# Patient Record
Sex: FEMALE | Race: WHITE | NOT HISPANIC OR LATINO | Employment: FULL TIME | URBAN - METROPOLITAN AREA
[De-identification: names, ages, dates, MRNs, and addresses within clinical notes are randomized per-mention and may not be internally consistent; named-entity substitution may affect disease eponyms.]

---

## 2017-10-11 ENCOUNTER — OFFICE VISIT (OUTPATIENT)
Dept: URGENT CARE | Facility: PHYSICIAN GROUP | Age: 27
End: 2017-10-11
Payer: COMMERCIAL

## 2017-10-11 VITALS
RESPIRATION RATE: 16 BRPM | BODY MASS INDEX: 31.16 KG/M2 | OXYGEN SATURATION: 97 % | SYSTOLIC BLOOD PRESSURE: 118 MMHG | WEIGHT: 187 LBS | TEMPERATURE: 98.2 F | HEIGHT: 65 IN | DIASTOLIC BLOOD PRESSURE: 76 MMHG | HEART RATE: 102 BPM

## 2017-10-11 DIAGNOSIS — E66.9 OBESITY (BMI 30-39.9): ICD-10-CM

## 2017-10-11 DIAGNOSIS — J02.0 STREP PHARYNGITIS: ICD-10-CM

## 2017-10-11 DIAGNOSIS — J02.9 SORE THROAT: ICD-10-CM

## 2017-10-11 LAB
INT CON NEG: NORMAL
INT CON POS: NORMAL
S PYO AG THROAT QL: POSITIVE

## 2017-10-11 PROCEDURE — 87880 STREP A ASSAY W/OPTIC: CPT | Performed by: PHYSICIAN ASSISTANT

## 2017-10-11 PROCEDURE — 99203 OFFICE O/P NEW LOW 30 MIN: CPT | Performed by: PHYSICIAN ASSISTANT

## 2017-10-11 RX ORDER — AMOXICILLIN 875 MG/1
875 TABLET, COATED ORAL 2 TIMES DAILY
Qty: 20 TAB | Refills: 0 | Status: SHIPPED | OUTPATIENT
Start: 2017-10-11 | End: 2020-06-28

## 2017-10-11 RX ORDER — IBUPROFEN 200 MG
400 TABLET ORAL EVERY 8 HOURS PRN
COMMUNITY
End: 2020-06-28

## 2017-10-11 ASSESSMENT — ENCOUNTER SYMPTOMS
FEVER: 1
SORE THROAT: 1
DIARRHEA: 0
WHEEZING: 0
CONSTIPATION: 0
COUGH: 0
CARDIOVASCULAR NEGATIVE: 1
HOARSE VOICE: 1
SWOLLEN GLANDS: 1
ABDOMINAL PAIN: 0
CHILLS: 1
MYALGIAS: 1
TROUBLE SWALLOWING: 1
SHORTNESS OF BREATH: 0
HEADACHES: 1
VOMITING: 0

## 2017-10-11 NOTE — PROGRESS NOTES
Subjective:      Sweta Gill is a 26 y.o. female who presents with Pharyngitis (x 2 days)            Pharyngitis    This is a new problem. The current episode started in the past 7 days. The problem has been gradually worsening. The maximum temperature recorded prior to her arrival was 100.4 - 100.9 F. The fever has been present for 1 to 2 days. Associated symptoms include headaches, a hoarse voice, swollen glands and trouble swallowing. Pertinent negatives include no abdominal pain, congestion, coughing, diarrhea, ear pain, shortness of breath or vomiting. She has had no exposure to strep. She has tried NSAIDs for the symptoms. The treatment provided mild relief.       PMH:  has no past medical history on file.  MEDS:   Current Outpatient Prescriptions:   •  ibuprofen (MOTRIN) 200 MG Tab, Take 400 mg by mouth every 8 hours as needed., Disp: , Rfl:   •  amoxicillin (AMOXIL) 875 MG tablet, Take 1 Tab by mouth 2 times a day., Disp: 20 Tab, Rfl: 0  •  azithromycin (ZITHROMAX Z-EVIN) 250 MG TABS, Take 1 Tab by mouth every day. Take 2 tablets by mouth day one and one tablet by mouth days 2 through 5, Disp: 6 Tab, Rfl: 0  ALLERGIES: No Known Allergies  SURGHX: No past surgical history on file.  SOCHX:  reports that she has been smoking.  She has been smoking about 0.50 packs per day. She does not have any smokeless tobacco history on file. She reports that she drinks alcohol. She reports that she does not use drugs.  FH: family history is not on file.    Review of Systems   Constitutional: Positive for chills and fever.   HENT: Positive for hoarse voice, sore throat and trouble swallowing. Negative for congestion and ear pain.    Respiratory: Negative for cough, shortness of breath and wheezing.    Cardiovascular: Negative.    Gastrointestinal: Negative for abdominal pain, constipation, diarrhea and vomiting.   Musculoskeletal: Positive for myalgias.   Neurological: Positive for headaches.       Medications,  "Allergies, and current problem list reviewed today in Epic  Family history reviewed with patient and is not pertinent for today's visit     Objective:     /76   Pulse (!) 102   Temp 36.8 °C (98.2 °F)   Resp 16   Ht 1.651 m (5' 5\")   Wt 84.8 kg (187 lb)   SpO2 97%   BMI 31.12 kg/m²      Physical Exam   Constitutional: She is oriented to person, place, and time. She appears well-developed and well-nourished. No distress.   HENT:   Head: Normocephalic and atraumatic.   Right Ear: Hearing, tympanic membrane and external ear normal.   Left Ear: Hearing, tympanic membrane and external ear normal.   Nose: Nose normal.   Mouth/Throat: Normal dentition. No dental caries. Oropharyngeal exudate, posterior oropharyngeal edema and posterior oropharyngeal erythema present.   Eyes: Conjunctivae and EOM are normal. Pupils are equal, round, and reactive to light. Right eye exhibits no discharge. Left eye exhibits no discharge. No scleral icterus.   Neck: Normal range of motion. Neck supple. No tracheal deviation present. No thyromegaly present.   Cardiovascular: Normal rate, regular rhythm and normal heart sounds.    Pulmonary/Chest: Effort normal and breath sounds normal. No respiratory distress. She has no wheezes.   Lymphadenopathy:        Head (right side): Submandibular adenopathy present.        Head (left side): Submandibular adenopathy present.     She has cervical adenopathy.        Right cervical: No superficial cervical adenopathy present.       Left cervical: No superficial cervical adenopathy present.   Neurological: She is alert and oriented to person, place, and time.   Skin: Skin is warm and dry. She is not diaphoretic. No cyanosis. Nails show no clubbing.   Psychiatric: She has a normal mood and affect. Her behavior is normal. Judgment and thought content normal.   Nursing note and vitals reviewed.              Assessment/Plan:     1. Sore throat  POCT Rapid Strep A   2. Strep pharyngitis  amoxicillin " (AMOXIL) 875 MG tablet     Strep: POS  Take full course of antibiotic  Increase fluids and rest  Advil or Tylenol for fever and pain  Warm beverages or Chloraseptic spray    Return to clinic or go to ED if symptoms worsen or persist. Indications for ED discussed at length. Patient voices understanding. Follow-up with your primary care provider in 3-5 days. Red flags discussed.    Please note that this dictation was created using voice recognition software. I have made every reasonable attempt to correct obvious errors, but I expect that there are errors of grammar and possibly content that I did not discover before finalizing the note.

## 2017-10-11 NOTE — LETTER
October 11, 2017         Patient: Sweta Gill   YOB: 1990   Date of Visit: 10/11/2017           To Whom it May Concern:    Sweta Gill was seen in my clinic on 10/11/2017. She may return to work on Thurs. Oct. 12th.    If you have any questions or concerns, please don't hesitate to call.        Sincerely,           Kendall Nichols P.A.-C.  Electronically Signed

## 2018-05-30 ENCOUNTER — HOSPITAL ENCOUNTER (INPATIENT)
Dept: HOSPITAL 8 - ED | Age: 28
LOS: 5 days | Discharge: HOME | DRG: 690 | End: 2018-06-04
Attending: OBSTETRICS & GYNECOLOGY | Admitting: OBSTETRICS & GYNECOLOGY
Payer: COMMERCIAL

## 2018-05-30 VITALS — WEIGHT: 175.49 LBS | BODY MASS INDEX: 29.24 KG/M2 | HEIGHT: 65 IN

## 2018-05-30 VITALS — DIASTOLIC BLOOD PRESSURE: 77 MMHG | SYSTOLIC BLOOD PRESSURE: 117 MMHG

## 2018-05-30 DIAGNOSIS — A54.24: ICD-10-CM

## 2018-05-30 DIAGNOSIS — N70.93: ICD-10-CM

## 2018-05-30 DIAGNOSIS — A56.11: Primary | ICD-10-CM

## 2018-05-30 DIAGNOSIS — F17.200: ICD-10-CM

## 2018-05-30 DIAGNOSIS — N83.209: ICD-10-CM

## 2018-05-30 LAB
CLUE CELLS: (no result)
T VAGINALIS RRNA GENITAL QL PROBE: (no result)
T4 FREE SERPL-MCNC: 1.05 NG/DL (ref 0.76–1.46)
TSH SERPL-ACNC: 1.69 MIU/L (ref 0.36–3.74)
WET PREP WBCS: (no result)

## 2018-05-30 PROCEDURE — S0074 INJECTION, CEFOTETAN DISODIU: HCPCS

## 2018-05-30 PROCEDURE — 96374 THER/PROPH/DIAG INJ IV PUSH: CPT

## 2018-05-30 PROCEDURE — 36415 COLL VENOUS BLD VENIPUNCTURE: CPT

## 2018-05-30 PROCEDURE — 87205 SMEAR GRAM STAIN: CPT

## 2018-05-30 PROCEDURE — 86592 SYPHILIS TEST NON-TREP QUAL: CPT

## 2018-05-30 PROCEDURE — 85025 COMPLETE CBC W/AUTO DIFF WBC: CPT

## 2018-05-30 PROCEDURE — 76830 TRANSVAGINAL US NON-OB: CPT

## 2018-05-30 PROCEDURE — 87591 N.GONORRHOEAE DNA AMP PROB: CPT

## 2018-05-30 PROCEDURE — 87491 CHLMYD TRACH DNA AMP PROBE: CPT

## 2018-05-30 PROCEDURE — 87147 CULTURE TYPE IMMUNOLOGIC: CPT

## 2018-05-30 PROCEDURE — 84439 ASSAY OF FREE THYROXINE: CPT

## 2018-05-30 PROCEDURE — 87210 SMEAR WET MOUNT SALINE/INK: CPT

## 2018-05-30 PROCEDURE — 87070 CULTURE OTHR SPECIMN AEROBIC: CPT

## 2018-05-30 PROCEDURE — 84443 ASSAY THYROID STIM HORMONE: CPT

## 2018-05-30 PROCEDURE — 87808 TRICHOMONAS ASSAY W/OPTIC: CPT

## 2018-05-30 RX ADMIN — CEFOTETAN AND DEXTROSE SCH MLS/HR: 2 INJECTION, SOLUTION INTRAVENOUS at 19:20

## 2018-05-30 RX ADMIN — DOXYCYCLINE SCH MLS/HR: 100 INJECTION, POWDER, LYOPHILIZED, FOR SOLUTION INTRAVENOUS at 21:06

## 2018-05-31 VITALS — DIASTOLIC BLOOD PRESSURE: 79 MMHG | SYSTOLIC BLOOD PRESSURE: 120 MMHG

## 2018-05-31 VITALS — SYSTOLIC BLOOD PRESSURE: 124 MMHG | DIASTOLIC BLOOD PRESSURE: 84 MMHG

## 2018-05-31 VITALS — SYSTOLIC BLOOD PRESSURE: 120 MMHG | DIASTOLIC BLOOD PRESSURE: 76 MMHG

## 2018-05-31 VITALS — SYSTOLIC BLOOD PRESSURE: 107 MMHG | DIASTOLIC BLOOD PRESSURE: 69 MMHG

## 2018-05-31 LAB
BASOPHILS # BLD AUTO: 0.01 X10^3/UL (ref 0–0.1)
BASOPHILS NFR BLD AUTO: 0 % (ref 0–1)
EOSINOPHIL # BLD AUTO: 0.22 X10^3/UL (ref 0–0.4)
EOSINOPHIL NFR BLD AUTO: 1 % (ref 1–7)
ERYTHROCYTE [DISTWIDTH] IN BLOOD BY AUTOMATED COUNT: 13.2 % (ref 9.6–15.2)
LYMPHOCYTES # BLD AUTO: 1.7 X10^3/UL (ref 1–3.4)
LYMPHOCYTES NFR BLD AUTO: 11 % (ref 22–44)
MCH RBC QN AUTO: 28.1 PG (ref 27–34.8)
MCHC RBC AUTO-ENTMCNC: 31.9 G/DL (ref 32.4–35.8)
MCV RBC AUTO: 87.9 FL (ref 80–100)
MD: NO
MONOCYTES # BLD AUTO: 0.5 X10^3/UL (ref 0.2–0.8)
MONOCYTES NFR BLD AUTO: 3 % (ref 2–9)
NEUTROPHILS # BLD AUTO: 13.56 X10^3/UL (ref 1.8–6.8)
NEUTROPHILS NFR BLD AUTO: 85 % (ref 42–75)
PLATELET # BLD AUTO: 647 X10^3/UL (ref 130–400)
PMV BLD AUTO: 6.4 FL (ref 7.4–10.4)
RBC # BLD AUTO: 4.29 X10^6/UL (ref 3.82–5.3)

## 2018-05-31 RX ADMIN — CEFOTETAN AND DEXTROSE SCH MLS/HR: 2 INJECTION, SOLUTION INTRAVENOUS at 19:45

## 2018-05-31 RX ADMIN — CEFOTETAN AND DEXTROSE SCH MLS/HR: 2 INJECTION, SOLUTION INTRAVENOUS at 08:33

## 2018-05-31 RX ADMIN — DOXYCYCLINE SCH MLS/HR: 100 INJECTION, POWDER, LYOPHILIZED, FOR SOLUTION INTRAVENOUS at 09:33

## 2018-05-31 RX ADMIN — DOXYCYCLINE SCH MLS/HR: 100 INJECTION, POWDER, LYOPHILIZED, FOR SOLUTION INTRAVENOUS at 21:16

## 2018-06-01 VITALS — DIASTOLIC BLOOD PRESSURE: 80 MMHG | SYSTOLIC BLOOD PRESSURE: 120 MMHG

## 2018-06-01 VITALS — DIASTOLIC BLOOD PRESSURE: 76 MMHG | SYSTOLIC BLOOD PRESSURE: 123 MMHG

## 2018-06-01 VITALS — DIASTOLIC BLOOD PRESSURE: 80 MMHG | SYSTOLIC BLOOD PRESSURE: 126 MMHG

## 2018-06-01 VITALS — DIASTOLIC BLOOD PRESSURE: 58 MMHG | SYSTOLIC BLOOD PRESSURE: 113 MMHG

## 2018-06-01 LAB
BASOPHILS # BLD AUTO: 0.01 X10^3/UL (ref 0–0.1)
BASOPHILS NFR BLD AUTO: 0 % (ref 0–1)
EOSINOPHIL # BLD AUTO: 0.19 X10^3/UL (ref 0–0.4)
EOSINOPHIL NFR BLD AUTO: 1 % (ref 1–7)
ERYTHROCYTE [DISTWIDTH] IN BLOOD BY AUTOMATED COUNT: 12.9 % (ref 9.6–15.2)
LYMPHOCYTES # BLD AUTO: 1.74 X10^3/UL (ref 1–3.4)
LYMPHOCYTES NFR BLD AUTO: 12 % (ref 22–44)
MCH RBC QN AUTO: 29.8 PG (ref 27–34.8)
MCHC RBC AUTO-ENTMCNC: 33.7 G/DL (ref 32.4–35.8)
MCV RBC AUTO: 88.3 FL (ref 80–100)
MD: NO
MONOCYTES # BLD AUTO: 0.45 X10^3/UL (ref 0.2–0.8)
MONOCYTES NFR BLD AUTO: 3 % (ref 2–9)
NEUTROPHILS # BLD AUTO: 12.75 X10^3/UL (ref 1.8–6.8)
NEUTROPHILS NFR BLD AUTO: 84 % (ref 42–75)
PLATELET # BLD AUTO: 638 X10^3/UL (ref 130–400)
PMV BLD AUTO: 6.4 FL (ref 7.4–10.4)
RBC # BLD AUTO: 4.4 X10^6/UL (ref 3.82–5.3)

## 2018-06-01 RX ADMIN — DOXYCYCLINE SCH MLS/HR: 100 INJECTION, POWDER, LYOPHILIZED, FOR SOLUTION INTRAVENOUS at 21:30

## 2018-06-01 RX ADMIN — DOXYCYCLINE SCH MLS/HR: 100 INJECTION, POWDER, LYOPHILIZED, FOR SOLUTION INTRAVENOUS at 09:17

## 2018-06-01 RX ADMIN — DEXTROSE SCH MLS/HR: 50 INJECTION, SOLUTION INTRAVENOUS at 08:22

## 2018-06-01 RX ADMIN — DEXTROSE SCH MLS/HR: 50 INJECTION, SOLUTION INTRAVENOUS at 20:29

## 2018-06-02 VITALS — DIASTOLIC BLOOD PRESSURE: 79 MMHG | SYSTOLIC BLOOD PRESSURE: 139 MMHG

## 2018-06-02 VITALS — SYSTOLIC BLOOD PRESSURE: 109 MMHG | DIASTOLIC BLOOD PRESSURE: 70 MMHG

## 2018-06-02 VITALS — SYSTOLIC BLOOD PRESSURE: 118 MMHG | DIASTOLIC BLOOD PRESSURE: 78 MMHG

## 2018-06-02 VITALS — SYSTOLIC BLOOD PRESSURE: 111 MMHG | DIASTOLIC BLOOD PRESSURE: 70 MMHG

## 2018-06-02 LAB
BASOPHILS # BLD AUTO: 0.07 X10^3/UL (ref 0–0.1)
BASOPHILS NFR BLD AUTO: 1 % (ref 0–1)
EOSINOPHIL # BLD AUTO: 0.16 X10^3/UL (ref 0–0.4)
EOSINOPHIL NFR BLD AUTO: 1 % (ref 1–7)
ERYTHROCYTE [DISTWIDTH] IN BLOOD BY AUTOMATED COUNT: 13.3 % (ref 9.6–15.2)
LYMPHOCYTES # BLD AUTO: 1.86 X10^3/UL (ref 1–3.4)
LYMPHOCYTES NFR BLD AUTO: 13 % (ref 22–44)
MCH RBC QN AUTO: 29.2 PG (ref 27–34.8)
MCHC RBC AUTO-ENTMCNC: 33.6 G/DL (ref 32.4–35.8)
MCV RBC AUTO: 86.8 FL (ref 80–100)
MD: NO
MONOCYTES # BLD AUTO: 0.68 X10^3/UL (ref 0.2–0.8)
MONOCYTES NFR BLD AUTO: 5 % (ref 2–9)
NEUTROPHILS # BLD AUTO: 11.44 X10^3/UL (ref 1.8–6.8)
NEUTROPHILS NFR BLD AUTO: 81 % (ref 42–75)
PLATELET # BLD AUTO: 659 X10^3/UL (ref 130–400)
PMV BLD AUTO: 6.7 FL (ref 7.4–10.4)
RBC # BLD AUTO: 4.35 X10^6/UL (ref 3.82–5.3)

## 2018-06-02 RX ADMIN — DEXTROSE SCH MLS/HR: 50 INJECTION, SOLUTION INTRAVENOUS at 08:53

## 2018-06-02 RX ADMIN — DOXYCYCLINE SCH MLS/HR: 100 INJECTION, POWDER, LYOPHILIZED, FOR SOLUTION INTRAVENOUS at 09:42

## 2018-06-02 RX ADMIN — DOXYCYCLINE HYCLATE SCH MG: 100 CAPSULE ORAL at 20:41

## 2018-06-02 RX ADMIN — DEXTROSE SCH MLS/HR: 50 INJECTION, SOLUTION INTRAVENOUS at 20:40

## 2018-06-03 VITALS — SYSTOLIC BLOOD PRESSURE: 120 MMHG | DIASTOLIC BLOOD PRESSURE: 79 MMHG

## 2018-06-03 VITALS — DIASTOLIC BLOOD PRESSURE: 74 MMHG | SYSTOLIC BLOOD PRESSURE: 106 MMHG

## 2018-06-03 VITALS — SYSTOLIC BLOOD PRESSURE: 109 MMHG | DIASTOLIC BLOOD PRESSURE: 71 MMHG

## 2018-06-03 VITALS — DIASTOLIC BLOOD PRESSURE: 76 MMHG | SYSTOLIC BLOOD PRESSURE: 111 MMHG

## 2018-06-03 LAB
BASOPHILS # BLD AUTO: 0.04 X10^3/UL (ref 0–0.1)
BASOPHILS NFR BLD AUTO: 0 % (ref 0–1)
EOSINOPHIL # BLD AUTO: 0.12 X10^3/UL (ref 0–0.4)
EOSINOPHIL NFR BLD AUTO: 1 % (ref 1–7)
ERYTHROCYTE [DISTWIDTH] IN BLOOD BY AUTOMATED COUNT: 13.1 % (ref 9.6–15.2)
LYMPHOCYTES # BLD AUTO: 1.51 X10^3/UL (ref 1–3.4)
LYMPHOCYTES NFR BLD AUTO: 16 % (ref 22–44)
MCH RBC QN AUTO: 29.4 PG (ref 27–34.8)
MCHC RBC AUTO-ENTMCNC: 33.5 G/DL (ref 32.4–35.8)
MCV RBC AUTO: 87.7 FL (ref 80–100)
MD: NO
MONOCYTES # BLD AUTO: 0.32 X10^3/UL (ref 0.2–0.8)
MONOCYTES NFR BLD AUTO: 3 % (ref 2–9)
NEUTROPHILS # BLD AUTO: 7.45 X10^3/UL (ref 1.8–6.8)
NEUTROPHILS NFR BLD AUTO: 79 % (ref 42–75)
PLATELET # BLD AUTO: 728 X10^3/UL (ref 130–400)
PMV BLD AUTO: 6.8 FL (ref 7.4–10.4)
RBC # BLD AUTO: 4.51 X10^6/UL (ref 3.82–5.3)

## 2018-06-03 RX ADMIN — DOXYCYCLINE HYCLATE SCH MG: 100 CAPSULE ORAL at 09:06

## 2018-06-03 RX ADMIN — DEXTROSE SCH MLS/HR: 50 INJECTION, SOLUTION INTRAVENOUS at 09:06

## 2018-06-03 RX ADMIN — DEXTROSE SCH MLS/HR: 50 INJECTION, SOLUTION INTRAVENOUS at 20:48

## 2018-06-03 RX ADMIN — DOXYCYCLINE HYCLATE SCH MG: 100 CAPSULE ORAL at 20:48

## 2018-06-04 VITALS — DIASTOLIC BLOOD PRESSURE: 80 MMHG | SYSTOLIC BLOOD PRESSURE: 117 MMHG

## 2018-06-04 VITALS — DIASTOLIC BLOOD PRESSURE: 77 MMHG | SYSTOLIC BLOOD PRESSURE: 122 MMHG

## 2018-06-04 VITALS — DIASTOLIC BLOOD PRESSURE: 79 MMHG | SYSTOLIC BLOOD PRESSURE: 119 MMHG

## 2018-06-04 LAB
BASOPHILS # BLD AUTO: 0.01 X10^3/UL (ref 0–0.1)
BASOPHILS NFR BLD AUTO: 0 % (ref 0–1)
EOSINOPHIL # BLD AUTO: 0.18 X10^3/UL (ref 0–0.4)
EOSINOPHIL NFR BLD AUTO: 2 % (ref 1–7)
ERYTHROCYTE [DISTWIDTH] IN BLOOD BY AUTOMATED COUNT: 13.2 % (ref 9.6–15.2)
LYMPHOCYTES # BLD AUTO: 1.64 X10^3/UL (ref 1–3.4)
LYMPHOCYTES NFR BLD AUTO: 17 % (ref 22–44)
MCH RBC QN AUTO: 28.9 PG (ref 27–34.8)
MCHC RBC AUTO-ENTMCNC: 33.6 G/DL (ref 32.4–35.8)
MCV RBC AUTO: 86 FL (ref 80–100)
MD: NO
MONOCYTES # BLD AUTO: 0.55 X10^3/UL (ref 0.2–0.8)
MONOCYTES NFR BLD AUTO: 6 % (ref 2–9)
NEUTROPHILS # BLD AUTO: 7.28 X10^3/UL (ref 1.8–6.8)
NEUTROPHILS NFR BLD AUTO: 75 % (ref 42–75)
PLATELET # BLD AUTO: 683 X10^3/UL (ref 130–400)
PMV BLD AUTO: 6.1 FL (ref 7.4–10.4)
RBC # BLD AUTO: 4.52 X10^6/UL (ref 3.82–5.3)

## 2018-06-04 RX ADMIN — DEXTROSE SCH MLS/HR: 50 INJECTION, SOLUTION INTRAVENOUS at 09:13

## 2018-06-04 RX ADMIN — DOXYCYCLINE HYCLATE SCH MG: 100 CAPSULE ORAL at 09:13

## 2018-06-09 ENCOUNTER — NON-PROVIDER VISIT (OUTPATIENT)
Dept: URGENT CARE | Facility: PHYSICIAN GROUP | Age: 28
End: 2018-06-09

## 2018-06-09 DIAGNOSIS — Z02.1 PRE-EMPLOYMENT DRUG SCREENING: ICD-10-CM

## 2018-06-09 LAB
AMP AMPHETAMINE: NEGATIVE
COC COCAINE: NEGATIVE
INT CON NEG: NORMAL
INT CON POS: NORMAL
MET METHAMPHETAMINES: NEGATIVE
OPI OPIATES: NEGATIVE
PCP PHENCYCLIDINE: NEGATIVE
POC DRUG COMMENT 753798-OCCUPATIONAL HEALTH: NORMAL
THC: NEGATIVE

## 2018-06-09 PROCEDURE — 80305 DRUG TEST PRSMV DIR OPT OBS: CPT | Performed by: INTERNAL MEDICINE

## 2020-06-28 ENCOUNTER — APPOINTMENT (OUTPATIENT)
Dept: RADIOLOGY | Facility: IMAGING CENTER | Age: 30
End: 2020-06-28
Attending: FAMILY MEDICINE
Payer: COMMERCIAL

## 2020-06-28 ENCOUNTER — HOSPITAL ENCOUNTER (OUTPATIENT)
Dept: RADIOLOGY | Facility: MEDICAL CENTER | Age: 30
End: 2020-06-28
Attending: FAMILY MEDICINE
Payer: COMMERCIAL

## 2020-06-28 ENCOUNTER — OFFICE VISIT (OUTPATIENT)
Dept: URGENT CARE | Facility: PHYSICIAN GROUP | Age: 30
End: 2020-06-28
Payer: COMMERCIAL

## 2020-06-28 VITALS
OXYGEN SATURATION: 98 % | HEIGHT: 65 IN | BODY MASS INDEX: 34.99 KG/M2 | WEIGHT: 210 LBS | HEART RATE: 112 BPM | DIASTOLIC BLOOD PRESSURE: 82 MMHG | SYSTOLIC BLOOD PRESSURE: 128 MMHG | TEMPERATURE: 98.6 F

## 2020-06-28 DIAGNOSIS — S89.92XA INJURY OF LEFT KNEE, INITIAL ENCOUNTER: ICD-10-CM

## 2020-06-28 DIAGNOSIS — S82.142A CLOSED FRACTURE OF LEFT TIBIAL PLATEAU, INITIAL ENCOUNTER: ICD-10-CM

## 2020-06-28 PROCEDURE — 99214 OFFICE O/P EST MOD 30 MIN: CPT | Performed by: FAMILY MEDICINE

## 2020-06-28 PROCEDURE — 73700 CT LOWER EXTREMITY W/O DYE: CPT | Mod: LT

## 2020-06-28 PROCEDURE — 73564 X-RAY EXAM KNEE 4 OR MORE: CPT | Mod: TC,LT | Performed by: FAMILY MEDICINE

## 2020-06-28 ASSESSMENT — PAIN SCALES - GENERAL: PAINLEVEL: 2=MINIMAL-SLIGHT

## 2020-06-28 ASSESSMENT — ENCOUNTER SYMPTOMS
SHORTNESS OF BREATH: 0
NECK PAIN: 0
BACK PAIN: 0
MYALGIAS: 0
WEIGHT LOSS: 0

## 2020-06-28 NOTE — PROGRESS NOTES
"Subjective:      Sweta Gill is a 29 y.o. female who presents with Leg Pain (LT leg Injury / Swollen brused x 1 day)            Onset yesterday left knee pain and swelling due to injury caused by going over the bars on a quad.  She landed in an [place].  No other injuries.  Unable to bear weight on left knee.  Severe pain with attempted weightbearing.  Unsure if blunt trauma or twisting mechanism.  Minimal relief with OTC NSAID and ice.  No prior injury.  No other aggravating or alleviating factors.      Review of Systems   Constitutional: Negative for malaise/fatigue and weight loss.   HENT:        She was wearing a helmet.  No head trauma   Respiratory: Negative for shortness of breath.    Cardiovascular: Negative for chest pain.   Gastrointestinal:        No trauma to abdomen   Musculoskeletal: Negative for back pain, joint pain, myalgias and neck pain.   Skin: Negative for itching and rash.     .  Medications, Allergies, and current problem list reviewed today in Epic       Objective:     /82   Pulse (!) 112   Temp 37 °C (98.6 °F) (Temporal)   Ht 1.651 m (5' 5\") Comment: patient is unable to stand  Wt 95.3 kg (210 lb) Comment: patient unable to stand  LMP 06/22/2020   SpO2 98%   BMI 34.95 kg/m²      Physical Exam  Constitutional:       Appearance: Normal appearance.   HENT:      Head: Normocephalic and atraumatic.   Musculoskeletal:      Comments: Left knee: Bony tenderness to anterior patella and proximal tibia without obvious deformity.  Soft tissue swelling.  Guarding with range of motion.  No obvious instability with limited exam.  Distal neurovascular intact.   Skin:     General: Skin is warm and dry.   Neurological:      General: No focal deficit present.      Mental Status: She is alert and oriented to person, place, and time.                 Assessment/Plan:   X-ray per radiology:  Acute split fracture of the lateral tibial plateau. Knee effusion.        1. Injury of left knee, " initial encounter  DX-KNEE COMPLETE 4+ LEFT   2. Closed fracture of left tibial plateau, initial encounter  REFERRAL TO ORTHOPEDICS    CT-KNEE W/O PLUS RECONS LEFT     Differential diagnosis, natural history, supportive care, and indications for immediate follow-up discussed at length.     Discussed with Dr. Barbosa.  Will require surgery but not emergently.  Will get CT scan and ask her to call the office in the morning.  She understands signs and symptoms of compartment syndrome.    Knee immobilizer and no weightbearing.  Crutches fitted.  Ice and NSAID as needed.

## 2020-07-01 ENCOUNTER — ANESTHESIA EVENT (OUTPATIENT)
Dept: SURGERY | Facility: MEDICAL CENTER | Age: 30
End: 2020-07-01
Payer: COMMERCIAL

## 2020-07-01 ENCOUNTER — APPOINTMENT (OUTPATIENT)
Dept: RADIOLOGY | Facility: MEDICAL CENTER | Age: 30
End: 2020-07-01
Attending: ORTHOPAEDIC SURGERY
Payer: COMMERCIAL

## 2020-07-01 ENCOUNTER — HOSPITAL ENCOUNTER (OUTPATIENT)
Facility: MEDICAL CENTER | Age: 30
End: 2020-07-01
Attending: ORTHOPAEDIC SURGERY | Admitting: ORTHOPAEDIC SURGERY
Payer: COMMERCIAL

## 2020-07-01 ENCOUNTER — ANESTHESIA (OUTPATIENT)
Dept: SURGERY | Facility: MEDICAL CENTER | Age: 30
End: 2020-07-01
Payer: COMMERCIAL

## 2020-07-01 VITALS
BODY MASS INDEX: 34.95 KG/M2 | RESPIRATION RATE: 16 BRPM | OXYGEN SATURATION: 97 % | DIASTOLIC BLOOD PRESSURE: 88 MMHG | TEMPERATURE: 97.9 F | HEART RATE: 92 BPM | WEIGHT: 210 LBS | SYSTOLIC BLOOD PRESSURE: 137 MMHG

## 2020-07-01 DIAGNOSIS — G89.18 POSTOPERATIVE PAIN: ICD-10-CM

## 2020-07-01 LAB
B-HCG FREE SERPL-ACNC: <5 MIU/ML
COVID ORDER STATUS COVID19: NORMAL
IHCGL IHCGL: NEGATIVE MIU/ML
SARS-COV-2 RDRP RESP QL NAA+PROBE: NOTDETECTED
SPECIMEN SOURCE: NORMAL

## 2020-07-01 PROCEDURE — A9270 NON-COVERED ITEM OR SERVICE: HCPCS | Performed by: ORTHOPAEDIC SURGERY

## 2020-07-01 PROCEDURE — 302135 SEQUENTIAL COMPRESSION MACHINE: Performed by: ORTHOPAEDIC SURGERY

## 2020-07-01 PROCEDURE — 160048 HCHG OR STATISTICAL LEVEL 1-5: Performed by: ORTHOPAEDIC SURGERY

## 2020-07-01 PROCEDURE — 160046 HCHG PACU - 1ST 60 MINS PHASE II: Performed by: ORTHOPAEDIC SURGERY

## 2020-07-01 PROCEDURE — 84702 CHORIONIC GONADOTROPIN TEST: CPT

## 2020-07-01 PROCEDURE — 160029 HCHG SURGERY MINUTES - 1ST 30 MINS LEVEL 4: Performed by: ORTHOPAEDIC SURGERY

## 2020-07-01 PROCEDURE — 160035 HCHG PACU - 1ST 60 MINS PHASE I: Performed by: ORTHOPAEDIC SURGERY

## 2020-07-01 PROCEDURE — 700102 HCHG RX REV CODE 250 W/ 637 OVERRIDE(OP): Performed by: ORTHOPAEDIC SURGERY

## 2020-07-01 PROCEDURE — 73590 X-RAY EXAM OF LOWER LEG: CPT | Mod: LT

## 2020-07-01 PROCEDURE — C1713 ANCHOR/SCREW BN/BN,TIS/BN: HCPCS | Performed by: ORTHOPAEDIC SURGERY

## 2020-07-01 PROCEDURE — 160036 HCHG PACU - EA ADDL 30 MINS PHASE I: Performed by: ORTHOPAEDIC SURGERY

## 2020-07-01 PROCEDURE — 700102 HCHG RX REV CODE 250 W/ 637 OVERRIDE(OP): Performed by: ANESTHESIOLOGY

## 2020-07-01 PROCEDURE — 160041 HCHG SURGERY MINUTES - EA ADDL 1 MIN LEVEL 4: Performed by: ORTHOPAEDIC SURGERY

## 2020-07-01 PROCEDURE — 160025 RECOVERY II MINUTES (STATS): Performed by: ORTHOPAEDIC SURGERY

## 2020-07-01 PROCEDURE — A9270 NON-COVERED ITEM OR SERVICE: HCPCS | Performed by: ANESTHESIOLOGY

## 2020-07-01 PROCEDURE — 502000 HCHG MISC OR IMPLANTS RC 0278: Performed by: ORTHOPAEDIC SURGERY

## 2020-07-01 PROCEDURE — 700111 HCHG RX REV CODE 636 W/ 250 OVERRIDE (IP): Performed by: ANESTHESIOLOGY

## 2020-07-01 PROCEDURE — 160002 HCHG RECOVERY MINUTES (STAT): Performed by: ORTHOPAEDIC SURGERY

## 2020-07-01 PROCEDURE — 700101 HCHG RX REV CODE 250: Performed by: ANESTHESIOLOGY

## 2020-07-01 PROCEDURE — 500881 HCHG PACK, EXTREMITY: Performed by: ORTHOPAEDIC SURGERY

## 2020-07-01 PROCEDURE — 501838 HCHG SUTURE GENERAL: Performed by: ORTHOPAEDIC SURGERY

## 2020-07-01 PROCEDURE — C9803 HOPD COVID-19 SPEC COLLECT: HCPCS | Performed by: ORTHOPAEDIC SURGERY

## 2020-07-01 PROCEDURE — 64447 NJX AA&/STRD FEMORAL NRV IMG: CPT | Performed by: ORTHOPAEDIC SURGERY

## 2020-07-01 PROCEDURE — 160009 HCHG ANES TIME/MIN: Performed by: ORTHOPAEDIC SURGERY

## 2020-07-01 PROCEDURE — 700101 HCHG RX REV CODE 250: Performed by: ORTHOPAEDIC SURGERY

## 2020-07-01 PROCEDURE — U0004 COV-19 TEST NON-CDC HGH THRU: HCPCS

## 2020-07-01 PROCEDURE — 501445 HCHG STAPLER, SKIN DISP: Performed by: ORTHOPAEDIC SURGERY

## 2020-07-01 PROCEDURE — 700105 HCHG RX REV CODE 258: Performed by: ORTHOPAEDIC SURGERY

## 2020-07-01 DEVICE — PLATE LATERIAL PROXIMAL TIBIA 4H LEFT (2TX1=2): Type: IMPLANTABLE DEVICE | Site: TIBIA | Status: FUNCTIONAL

## 2020-07-01 DEVICE — SCREW 3.5 MM LOCKING TI X 70MM LONG (6TX4=24): Type: IMPLANTABLE DEVICE | Site: TIBIA | Status: FUNCTIONAL

## 2020-07-01 DEVICE — SCREW 3.5 MM LOCKING TO X 45 MM LONG (6TX8=48): Type: IMPLANTABLE DEVICE | Site: TIBIA | Status: FUNCTIONAL

## 2020-07-01 DEVICE — SCREW 3.5 MM NON-LOCKING TI X 24MM LONG (6TX8=48): Type: IMPLANTABLE DEVICE | Site: TIBIA | Status: FUNCTIONAL

## 2020-07-01 DEVICE — BONE CHIPS 30CC FREEZE DRIED CANCELLOUS CRUSHED: Type: IMPLANTABLE DEVICE | Site: TIBIA | Status: FUNCTIONAL

## 2020-07-01 DEVICE — SCREW 3.5 MM NON-LOCKING TI X 75MM LONG (6TX4=24): Type: IMPLANTABLE DEVICE | Site: TIBIA | Status: FUNCTIONAL

## 2020-07-01 DEVICE — SCREW 3.5 MM NON-LOCKING TI X 70MM LONG (6TX4=24): Type: IMPLANTABLE DEVICE | Site: TIBIA | Status: FUNCTIONAL

## 2020-07-01 DEVICE — SCREW 3.5 MM NON-LOCKING TI X 40MM LONG (6TX8=48): Type: IMPLANTABLE DEVICE | Site: TIBIA | Status: FUNCTIONAL

## 2020-07-01 DEVICE — SCREW 3.5 MM LOCKING TI X 65MM LONG (6TX8=48): Type: IMPLANTABLE DEVICE | Site: TIBIA | Status: FUNCTIONAL

## 2020-07-01 DEVICE — SCREW 3.5 MM LOCKING TI X 55MM LONG (6TX8=48): Type: IMPLANTABLE DEVICE | Site: TIBIA | Status: FUNCTIONAL

## 2020-07-01 DEVICE — SCREW 3.5 MM NON-LOCKING TI X 36MM LONG (6TX8=48): Type: IMPLANTABLE DEVICE | Site: TIBIA | Status: FUNCTIONAL

## 2020-07-01 RX ORDER — DEXAMETHASONE SODIUM PHOSPHATE 4 MG/ML
INJECTION, SOLUTION INTRA-ARTICULAR; INTRALESIONAL; INTRAMUSCULAR; INTRAVENOUS; SOFT TISSUE PRN
Status: DISCONTINUED | OUTPATIENT
Start: 2020-07-01 | End: 2020-07-01 | Stop reason: SURG

## 2020-07-01 RX ORDER — HYDROMORPHONE HYDROCHLORIDE 1 MG/ML
0.4 INJECTION, SOLUTION INTRAMUSCULAR; INTRAVENOUS; SUBCUTANEOUS
Status: DISCONTINUED | OUTPATIENT
Start: 2020-07-01 | End: 2020-07-01 | Stop reason: HOSPADM

## 2020-07-01 RX ORDER — OXYCODONE HCL 10 MG/1
10 TABLET, FILM COATED, EXTENDED RELEASE ORAL ONCE
Status: COMPLETED | OUTPATIENT
Start: 2020-07-01 | End: 2020-07-01

## 2020-07-01 RX ORDER — ONDANSETRON 2 MG/ML
INJECTION INTRAMUSCULAR; INTRAVENOUS PRN
Status: DISCONTINUED | OUTPATIENT
Start: 2020-07-01 | End: 2020-07-01 | Stop reason: HOSPADM

## 2020-07-01 RX ORDER — MEPERIDINE HYDROCHLORIDE 25 MG/ML
12.5 INJECTION INTRAMUSCULAR; INTRAVENOUS; SUBCUTANEOUS
Status: DISCONTINUED | OUTPATIENT
Start: 2020-07-01 | End: 2020-07-01 | Stop reason: HOSPADM

## 2020-07-01 RX ORDER — OXYCODONE HYDROCHLORIDE 5 MG/1
5 TABLET ORAL EVERY 4 HOURS PRN
Qty: 20 TAB | Refills: 0 | Status: SHIPPED | OUTPATIENT
Start: 2020-07-01 | End: 2020-07-06

## 2020-07-01 RX ORDER — LIDOCAINE HYDROCHLORIDE 20 MG/ML
INJECTION, SOLUTION EPIDURAL; INFILTRATION; INTRACAUDAL; PERINEURAL PRN
Status: DISCONTINUED | OUTPATIENT
Start: 2020-07-01 | End: 2020-07-01 | Stop reason: SURG

## 2020-07-01 RX ORDER — CEFAZOLIN SODIUM 1 G/3ML
INJECTION, POWDER, FOR SOLUTION INTRAMUSCULAR; INTRAVENOUS PRN
Status: DISCONTINUED | OUTPATIENT
Start: 2020-07-01 | End: 2020-07-01 | Stop reason: SURG

## 2020-07-01 RX ORDER — DIPHENHYDRAMINE HYDROCHLORIDE 50 MG/ML
12.5 INJECTION INTRAMUSCULAR; INTRAVENOUS
Status: DISCONTINUED | OUTPATIENT
Start: 2020-07-01 | End: 2020-07-01 | Stop reason: HOSPADM

## 2020-07-01 RX ORDER — LABETALOL HYDROCHLORIDE 5 MG/ML
5 INJECTION, SOLUTION INTRAVENOUS
Status: DISCONTINUED | OUTPATIENT
Start: 2020-07-01 | End: 2020-07-01 | Stop reason: HOSPADM

## 2020-07-01 RX ORDER — OXYCODONE HCL 5 MG/5 ML
5 SOLUTION, ORAL ORAL
Status: COMPLETED | OUTPATIENT
Start: 2020-07-01 | End: 2020-07-01

## 2020-07-01 RX ORDER — HYDRALAZINE HYDROCHLORIDE 20 MG/ML
5 INJECTION INTRAMUSCULAR; INTRAVENOUS
Status: DISCONTINUED | OUTPATIENT
Start: 2020-07-01 | End: 2020-07-01 | Stop reason: HOSPADM

## 2020-07-01 RX ORDER — HYDROMORPHONE HYDROCHLORIDE 1 MG/ML
0.1 INJECTION, SOLUTION INTRAMUSCULAR; INTRAVENOUS; SUBCUTANEOUS
Status: DISCONTINUED | OUTPATIENT
Start: 2020-07-01 | End: 2020-07-01 | Stop reason: HOSPADM

## 2020-07-01 RX ORDER — CELECOXIB 200 MG/1
400 CAPSULE ORAL ONCE
Status: COMPLETED | OUTPATIENT
Start: 2020-07-01 | End: 2020-07-01

## 2020-07-01 RX ORDER — BUPIVACAINE HYDROCHLORIDE 5 MG/ML
INJECTION, SOLUTION EPIDURAL; INTRACAUDAL
Status: COMPLETED | OUTPATIENT
Start: 2020-07-01 | End: 2020-07-01

## 2020-07-01 RX ORDER — OXYCODONE HCL 5 MG/5 ML
10 SOLUTION, ORAL ORAL
Status: COMPLETED | OUTPATIENT
Start: 2020-07-01 | End: 2020-07-01

## 2020-07-01 RX ORDER — MIDAZOLAM HYDROCHLORIDE 1 MG/ML
1 INJECTION INTRAMUSCULAR; INTRAVENOUS
Status: DISCONTINUED | OUTPATIENT
Start: 2020-07-01 | End: 2020-07-01 | Stop reason: HOSPADM

## 2020-07-01 RX ORDER — GABAPENTIN 300 MG/1
300 CAPSULE ORAL ONCE
Status: COMPLETED | OUTPATIENT
Start: 2020-07-01 | End: 2020-07-01

## 2020-07-01 RX ORDER — ONDANSETRON 2 MG/ML
4 INJECTION INTRAMUSCULAR; INTRAVENOUS
Status: DISCONTINUED | OUTPATIENT
Start: 2020-07-01 | End: 2020-07-01 | Stop reason: HOSPADM

## 2020-07-01 RX ORDER — HYDROMORPHONE HYDROCHLORIDE 1 MG/ML
0.2 INJECTION, SOLUTION INTRAMUSCULAR; INTRAVENOUS; SUBCUTANEOUS
Status: DISCONTINUED | OUTPATIENT
Start: 2020-07-01 | End: 2020-07-01 | Stop reason: HOSPADM

## 2020-07-01 RX ORDER — SODIUM CHLORIDE, SODIUM LACTATE, POTASSIUM CHLORIDE, CALCIUM CHLORIDE 600; 310; 30; 20 MG/100ML; MG/100ML; MG/100ML; MG/100ML
INJECTION, SOLUTION INTRAVENOUS CONTINUOUS
Status: DISCONTINUED | OUTPATIENT
Start: 2020-07-01 | End: 2020-07-01 | Stop reason: HOSPADM

## 2020-07-01 RX ORDER — HALOPERIDOL 5 MG/ML
1 INJECTION INTRAMUSCULAR
Status: DISCONTINUED | OUTPATIENT
Start: 2020-07-01 | End: 2020-07-01 | Stop reason: HOSPADM

## 2020-07-01 RX ORDER — ACETAMINOPHEN 500 MG
1000 TABLET ORAL ONCE
Status: COMPLETED | OUTPATIENT
Start: 2020-07-01 | End: 2020-07-01

## 2020-07-01 RX ADMIN — FENTANYL CITRATE 50 MCG: 50 INJECTION INTRAMUSCULAR; INTRAVENOUS at 18:03

## 2020-07-01 RX ADMIN — BUPIVACAINE HYDROCHLORIDE 30 ML: 5 INJECTION, SOLUTION EPIDURAL; INTRACAUDAL; PERINEURAL at 15:42

## 2020-07-01 RX ADMIN — FENTANYL CITRATE 50 MCG: 50 INJECTION, SOLUTION INTRAMUSCULAR; INTRAVENOUS at 16:27

## 2020-07-01 RX ADMIN — FENTANYL CITRATE 50 MCG: 50 INJECTION, SOLUTION INTRAMUSCULAR; INTRAVENOUS at 16:22

## 2020-07-01 RX ADMIN — FENTANYL CITRATE 50 MCG: 50 INJECTION INTRAMUSCULAR; INTRAVENOUS at 18:12

## 2020-07-01 RX ADMIN — HYDROMORPHONE HYDROCHLORIDE 0.4 MG: 1 INJECTION, SOLUTION INTRAMUSCULAR; INTRAVENOUS; SUBCUTANEOUS at 18:30

## 2020-07-01 RX ADMIN — FENTANYL CITRATE 50 MCG: 50 INJECTION, SOLUTION INTRAMUSCULAR; INTRAVENOUS at 17:20

## 2020-07-01 RX ADMIN — GABAPENTIN 300 MG: 300 CAPSULE ORAL at 15:20

## 2020-07-01 RX ADMIN — LIDOCAINE HYDROCHLORIDE 0.5 ML: 10 INJECTION, SOLUTION INFILTRATION; PERINEURAL at 15:03

## 2020-07-01 RX ADMIN — POVIDONE-IODINE 15 ML: 10 SOLUTION TOPICAL at 15:03

## 2020-07-01 RX ADMIN — FENTANYL CITRATE 50 MCG: 50 INJECTION INTRAMUSCULAR; INTRAVENOUS at 17:52

## 2020-07-01 RX ADMIN — PROPOFOL 160 MG: 10 INJECTION, EMULSION INTRAVENOUS at 16:14

## 2020-07-01 RX ADMIN — FENTANYL CITRATE 50 MCG: 50 INJECTION INTRAMUSCULAR; INTRAVENOUS at 17:43

## 2020-07-01 RX ADMIN — LIDOCAINE HYDROCHLORIDE 5 ML: 20 INJECTION, SOLUTION EPIDURAL; INFILTRATION; INTRACAUDAL; PERINEURAL at 16:14

## 2020-07-01 RX ADMIN — DEXAMETHASONE SODIUM PHOSPHATE 8 MG: 4 INJECTION, SOLUTION INTRAMUSCULAR; INTRAVENOUS at 16:14

## 2020-07-01 RX ADMIN — OXYCODONE HYDROCHLORIDE 10 MG: 10 TABLET, FILM COATED, EXTENDED RELEASE ORAL at 15:20

## 2020-07-01 RX ADMIN — ONDANSETRON 4 MG: 2 INJECTION INTRAMUSCULAR; INTRAVENOUS at 16:14

## 2020-07-01 RX ADMIN — OXYCODONE HYDROCHLORIDE 10 MG: 5 SOLUTION ORAL at 17:42

## 2020-07-01 RX ADMIN — SODIUM CHLORIDE, POTASSIUM CHLORIDE, SODIUM LACTATE AND CALCIUM CHLORIDE: 600; 310; 30; 20 INJECTION, SOLUTION INTRAVENOUS at 15:03

## 2020-07-01 RX ADMIN — ACETAMINOPHEN 1000 MG: 500 TABLET, FILM COATED ORAL at 15:20

## 2020-07-01 RX ADMIN — CELECOXIB 400 MG: 200 CAPSULE ORAL at 15:20

## 2020-07-01 RX ADMIN — CEFAZOLIN 2 G: 1 INJECTION, POWDER, FOR SOLUTION INTRAVENOUS at 16:18

## 2020-07-01 ASSESSMENT — PAIN SCALES - GENERAL: PAIN_LEVEL: 5

## 2020-07-01 NOTE — ANESTHESIA PROCEDURE NOTES
Peripheral Block    Date/Time: 7/1/2020 3:42 PM  Performed by: Cristhian Bunch M.D.  Authorized by: Critshian Bunch M.D.     Patient Location:  Pre-op  Start Time:  7/1/2020 3:42 PM  End Time:  7/1/2020 3:48 PM  Reason for Block: at surgeon's request and post-op pain management    patient identified, IV checked, site marked, risks and benefits discussed, surgical consent, monitors and equipment checked, pre-op evaluation and timeout performed    Patient Position:  Supine  Prep: ChloraPrep    Monitoring:  Continuous pulse ox  Block Region:  Lower Extremity  Lower Extremity - Block Type:  Selective FEMORAL nerve block at the Adductor Canal and SCIATIC nerve block, lateral approach    Laterality:  Left  Procedures: ultrasound guided  Image captured, interpreted and electronically stored.  Local Infiltration:  Lidocaine  Strength:  1 %  Dose:  4 ml  Block Type:  Single-shot  Needle Length:  100mm  Needle Gauge:  21 G  Needle Localization:  Ultrasound guidance  Injection Assessment:  Incremental injection, no paresthesia on injection, negative aspiration for heme and local visualized surrounding nerve on ultrasound  Evidence of intravascular injection: No

## 2020-07-01 NOTE — H&P
7/1/2020    Sweta Gill is a 29 y.o. female who presents with a left tibial plateau fracture and is here for management. Patient denies numbness, parasthesias, loss of conciousness or other trauma    History reviewed. No pertinent past medical history.    History reviewed. No pertinent surgical history.    Medications  No current facility-administered medications on file prior to encounter.      No current outpatient medications on file prior to encounter.       Allergies  Patient has no known allergies.    ROS  Left knee pain. All other systems were reviewed and found to be negative    History reviewed. No pertinent family history.    Social History     Socioeconomic History   • Marital status: Single     Spouse name: Not on file   • Number of children: Not on file   • Years of education: Not on file   • Highest education level: Not on file   Occupational History   • Not on file   Social Needs   • Financial resource strain: Not on file   • Food insecurity     Worry: Not on file     Inability: Not on file   • Transportation needs     Medical: Not on file     Non-medical: Not on file   Tobacco Use   • Smoking status: Current Every Day Smoker     Packs/day: 0.50   • Smokeless tobacco: Never Used   Substance and Sexual Activity   • Alcohol use: Yes     Comment: occ   • Drug use: No   • Sexual activity: Not on file   Lifestyle   • Physical activity     Days per week: Not on file     Minutes per session: Not on file   • Stress: Not on file   Relationships   • Social connections     Talks on phone: Not on file     Gets together: Not on file     Attends [employer] service: Not on file     Active member of club or organization: Not on file     Attends meetings of clubs or organizations: Not on file     Relationship status: Not on file   • Intimate partner violence     Fear of current or ex partner: Not on file     Emotionally abused: Not on file     Physically abused: Not on file     Forced sexual activity: Not on  file   Other Topics Concern   • Not on file   Social History Narrative   • Not on file       Physical Exam  Vitals  Wt 95.3 kg (210 lb)   General: Well Developed, Well Nourished, no acute distress  HEENT: Normocephalic, atraumatic  Eyes: Anicteric, PERRLA, EOMI  Neck: Supple, nontender, no masses  Lungs: CTA, no wheezes or crackles  Heart: RRR, no murmurs, rubs or gallops  Abdomen: Soft, NT, ND  Pelvis: Stable to AP and Lateral Compression  Skin: Intact, no open wounds  Extremities: Left knee pain and swelling  Neuro: NVI  Vascular: 2+DP/PT, Capillary refill <2 seconds    Radiographs:  DX-TIBIA AND FIBULA LEFT    (Results Pending)   DX-PORTABLE FLUOROSCOPY < 1 HOUR Is the patient pregnant? No    (Results Pending)       Laboratory Values      No results for input(s): SODIUM, POTASSIUM, CHLORIDE, CO2, GLUCOSE, BUN, CPKTOTAL in the last 72 hours.          Impression: Left tibial plateau fracture    Plan:Operative intervention. Risks and benefits of surgery were discussed which include but are not limited to bleeding, infection, neurovascular damage, malunion, nonunion, instability, limb length discrepancy, DVT, PE, MI, Stroke and death. They understand these risks and wish to proceed.

## 2020-07-01 NOTE — OR NURSING
Pt allergies and NPO status verified. Home medications reconciled. Belongings secured. Pt verbalizes understanding of pain scale, expected course of stay and plan of care. Surgical site verified with pt. IV access established. Triple AIM completed. All questions answered. Bed in low position. Call light in reach.

## 2020-07-01 NOTE — ANESTHESIA PREPROCEDURE EVALUATION
29yoF with obesity with tibial plateau fracture    Received preop multimodals  NKDA  Covid neg  NPO  No AC in past      Relevant Problems   No relevant active problems       Physical Exam    Airway   Mallampati: II       Cardiovascular - normal exam     Dental - normal exam           Pulmonary - normal exam     Abdominal - normal exam  (+) obese     Neurological - normal exam                 Anesthesia Plan    ASA 2       Plan - general and peripheral nerve block     Peripheral nerve block will be post-op pain control  Airway plan will be LMA        Induction: intravenous    Postoperative Plan: Postoperative administration of opioids is intended.    Pertinent diagnostic labs and testing reviewed    Informed Consent:    Anesthetic plan and risks discussed with patient.

## 2020-07-01 NOTE — ANESTHESIA PROCEDURE NOTES
Airway    Date/Time: 7/1/2020 4:15 PM  Performed by: Cristhian Bunch M.D.  Authorized by: Cristhian Bunch M.D.     Location:  OR  Urgency:  Elective  Difficult Airway: No    Indications for Airway Management:  Anesthesia      Spontaneous Ventilation: absent    Sedation Level:  Deep  Preoxygenated: Yes    Patient Position:  Sniffing  Mask Difficulty Assessment:  0 - not attempted  Final Airway Type:  Supraglottic airway  Final Supraglottic Airway:  Standard LMA    SGA Size:  4  Number of Attempts at Approach:  1

## 2020-07-02 NOTE — OR NURSING
1729: To PACU from OR via gurney, sleeping, respirations spontaneous and non-labored via OPA. Icepack applied over c/d/i LLE surgical dressings. LLE immobilizer in place, LLE elevated on pillow.  1738: Rouses spontaneously, c/o severe LLE pain, plan analgesia.  1740: Tolerates po water.  1745: O2 d/dc  1755: Teary with persistent severe pain. O2 recommenced at 2 LPM    1810: No change  1819: Report to Angie [place]

## 2020-07-02 NOTE — OR NURSING
1957 Pt arrived from PACU post   ORIF, FRACTURE, TIBIAL PLATEAU  Left     , report received. Pt breathing unlabored with clear lung sounds bilat. Denies pain or nausea at this time. Pt dressing @ BS with assistance.     2030 Discharge instructions and medications gone over with Pt and ride. All questions answered. Pt meets DC criteria. PIV DC'd. RX for oxycodone provided. Pt transported to POV via WC in stable condition.

## 2020-07-02 NOTE — DISCHARGE INSTRUCTIONS
ACTIVITY: Rest and take it easy for the first 24 hours.  A responsible adult is recommended to remain with you during that time.  It is normal to feel sleepy.  We encourage you to not do anything that requires balance, judgment or coordination.    MILD FLU-LIKE SYMPTOMS ARE NORMAL. YOU MAY EXPERIENCE GENERALIZED MUSCLE ACHES, THROAT IRRITATION, HEADACHE AND/OR SOME NAUSEA.    FOR 24 HOURS DO NOT:  Drive, operate machinery or run household appliances.  Drink beer or alcoholic beverages.   Make important decisions or sign legal documents.    SPECIAL INSTRUCTIONS:     DISCHARGE INSTRUCTIONS:     ACTIVITY:  The patient should remain toe touch weightbearing with the use of gait aids (crutches, walker, cane, etc). PAIN CONTROL:  The patient has been prescribed a narcotic pain medication.  Side effects of narcotics pain medications include sedation and constipation.  To prevent constipation, it is recommended that the patient take an over the counter stool softener (such as Colace or Senna) and use laxatives as needed to prevent constipation.  Take these over the counter medications as directed by the packaging.  The patient may not drive while taking narcotic pain medication.  The patient should wean off their prescription pain medication by taking over the counter analgesics (such as Tylenol, Advil, Ibuprofen, etc).  Use caution when taking acetaminophen (Tylenol), as some narcotic medications contain acetaminophen.  The total dose of acetaminophen should not exceed 3000 mg daily.     WOUND CARE:  The patient has a surgical wound which was closed with staples or sutures.  These need to be removed 10-14 days after surgery.  If the patient is not in a splint or cast, the operative dressing should be removed 2 days after surgery, and dry gauze dressing changes should be performed daily after that.  You may shower when the operative dressing is removed. SPLINT/CAST CARE:  If present, you should keep your splint or cast  clean, dry, and intact.  You should elevate your operative extremity to minimize swelling in your fingers or toes.  If the sensation in your fingers or toes of your operative extremity changes, or the fingers/toes change colors, or should your pain become too difficult to control, you should immediately elevate your operative extremity.  If these symptoms do not resolve after 30 minutes to 1 hour, you should seek medical care immediately.     CALL YOUR DOCTOR IF:  You have fever >101.5 degrees F, you have increased or concerning wound drainage, you notice a great deal of redness around your incision, your pain can no longer be controlled, the sensation in your fingers or toes changes and does not respond to elevation, your cast or splint becomes saturated (wet) or other concerns.  If you suffer a medical emergency, seek immediate medical care at your nearest Emergency Room.    DIET: To avoid nausea, slowly advance diet as tolerated, avoiding spicy or greasy foods for the first day.  Add more substantial food to your diet according to your physician's instructions. INCREASE FLUIDS AND FIBER TO AVOID CONSTIPATION.    FOLLOW-UP APPOINTMENT:  A follow-up appointment should be arranged with your doctor; call to schedule.    You should CALL YOUR PHYSICIAN if you develop:  Fever greater than 101 degrees F.  Pain not relieved by medication, or persistent nausea or vomiting.  Excessive bleeding (blood soaking through dressing) or unexpected drainage from the wound.  Extreme redness or swelling around the incision site, drainage of pus or foul smelling drainage.  Inability to urinate or empty your bladder within 8 hours.  Problems with breathing or chest pain.    You should call 911 if you develop problems with breathing or chest pain.  If you are unable to contact your doctor or surgical center, you should go to the nearest emergency room or urgent care center.  Physician's telephone #: [phone]    If any questions arise,  call your doctor.  If your doctor is not available, please feel free to call the Surgical Center at [phone].  The Center is open Monday through Friday from 7AM to 7PM.  You can also call the HEALTH HOTLINE open 24 hours/day, 7 days/week and speak to a nurse at [phone], or toll free at [phone].    A registered nurse may call you a few days after your surgery to see how you are doing after your procedure.    MEDICATIONS: Resume taking daily medication.  Take prescribed pain medication with food.  If no medication is prescribed, you may take non-aspirin pain medication if needed.  PAIN MEDICATION CAN BE VERY CONSTIPATING.  Take a stool softener or laxative such as senokot, pericolace, or milk of magnesia if needed.    Prescription given for Oxycodone.  Last pain medication given at 5:42 p.m. (10mg Oxycodone)    If your physician has prescribed pain medication that includes Acetaminophen (Tylenol), do not take additional Acetaminophen (Tylenol) while taking the prescribed medication.    Depression / Suicide Risk    As you are discharged from this [facility] facility, it is important to learn how to keep safe from harming yourself.    Recognize the warning signs:  · Abrupt changes in personality, positive or negative- including increase in energy   · Giving away possessions  · Change in eating patterns- significant weight changes-  positive or negative  · Change in sleeping patterns- unable to sleep or sleeping all the time   · Unwillingness or inability to communicate  · Depression  · Unusual sadness, discouragement and loneliness  · Talk of wanting to die  · Neglect of personal appearance   · Rebelliousness- reckless behavior  · Withdrawal from people/activities they love  · Confusion- inability to concentrate     If you or a loved one observes any of these behaviors or has concerns about self-harm, here's what you can do:  · Talk about it- your feelings and reasons for harming yourself  · Remove  any means that you might use to hurt yourself (examples: pills, rope, extension cords, firearm)  · Get professional help from the community (Mental Health, Substance Abuse, psychological counseling)  · Do not be alone:Call your Safe Contact- someone whom you trust who will be there for you.  · Call your local CRISIS HOTLINE 325-0551 or 252-034-5688  · Call your local Children's Mobile Crisis Response Team [place] (252) 803-1382 or [employer]  · Call the toll free National Suicide Prevention Hotlines   · National Suicide Prevention Lifeline 357-207-ZRXT (1703)  National Hope Line Network 800-SUICIDE (912-8390)    Peripheral Nerve Block Discharge Instructions from Same Day Surgery and Inpatient :    What to Expect - Lower Extremity  · The block may cause you to experience numbness and weakness in your hip and thigh, thigh and knee or calf and foot on the same side as your surgery  · Numbness, tingling and / or weakness are all normal. For some people, this may be an unpleasant sensation  · These issues will be resolved when the local anesthetic wears off   · You may experience numbness and tingling in your thigh on the same side as your surgery if the block medicine was injected at your groin area  · Numbness will make it difficult to walk  · You may have problems with balance and walking so be very careful   · Follow your surgeon's direction regarding weight bearing on your surgical limb  · Be very careful with your numb limb  Precautions  · The numbness may affect your balance  · Be careful when walking or moving around  · Your leg may be weak: be very careful putting weight on it  · If your surgeon did not specify a time, you should not bear weight for 24 hours  · Be sure to ask for help when you need it  · It is better to have help than to fall and hurt yourself  Prevent Injury  · Protect the limb like a baby  · Beware of exposing your limb to extreme heat or cold or trauma  · The limb may be  "injured without you noticing because it is numb  · Keep the limb elevated whenever possible  · Do not sleep on the limb  · Change the position of the limb regularly  · Avoid putting pressure on your surgical limb  Pain Control  · The initial block on the day of surgery will make your extremity feel \"numb\"  · Any consecutive injection including prior to discharge from the hospital will make your extremity feel \"numb\"  · You may feel an aching or burning when the local anesthesia starts to wear off  · Take pain pills as prescribed by your surgeon  · Call your surgeon or anesthesiologist if you do not have adequate pain control    "

## 2020-07-02 NOTE — OP REPORT
DATE OF SERVICE:  07/01/2020    PREOPERATIVE DIAGNOSES:  1.  Left split depression tibial plateau fracture.  2.  Left lateral meniscus tear.    POSTOPERATIVE DIAGNOSES:  1.  Left split depression tibial plateau fracture.  2.  Left lateral meniscus tear.    PROCEDURES:  1.  Open reduction and internal fixation, left lateral split depression tibial   plateau fracture.  2.  Open repair lateral meniscus.    SURGEON:  Pipo Vaca MD.    ASSISTANT:  Santosh Ruelas PA-C.    ESTIMATED BLOOD LOSS:  Minimal.    INDICATIONS:  This is a 29-year-old, who sustained a displaced lateral tibial   plateau fracture.  Risks and benefits of operative fixation were discussed at   length, which include but not limited to bleeding, infection, neurovascular   damage, pain, stiffness, malunion, nonunion, DVT, PE, MI, stroke, and death.    They understand all these risks and wished to proceed.    DESCRIPTION OF PROCEDURE:  Patient was sedated with LMA anesthesia and   administered preoperative antibiotics.  Left lower extremity was prepped and   draped in usual sterile fashion.  A standard lateral approach to the proximal   tibia was performed with care taken to avoid all neurovascular structures.    The joint line was entered, the meniscus was tagged for future repair.  The   lateral window was split open through the split to expose the distal depressed   segments, which were elevated and bone graft was packed behind these.  The   lateral window was then reduced and held with single lag screw followed by [facility]   proximal tibial locking plate with a combination of locking and nonlocking   fixation.  All screws were checked and found of appropriate length.  Now,   joint reduction was found to be anatomic.  The remainder of the meniscus was   repaired back to the plate with 0 Vicryl suture.  Skin was closed in layers.    Sterile dressing was applied.  Knee immobilizer was applied.  Patient   tolerated the procedure well.    POSTOPERATIVE  PLAN:  The patient will be discharged home, toe touch   weightbearing.  Follow up in 2 weeks' time for a wound check and repeat   x-rays.       ____________________________________     MARCELA MATHUR MD PLA / PADMAJA    DD:  07/01/2020 17:29:02  DT:  07/01/2020 20:57:27    D#:  [id number]  Job#:  [id number]

## 2020-07-02 NOTE — OR NURSING
1819 Report received from ALTHEA Lazcano. Pt resting in a gurney, crying and occassionally moaning. Surgical dressing to left leg. Immobilizer in place. Toes to affected extremity are pink and warm, brisk cap refill observed, pedal pulse palpable. Pt reports 10/10 pain to her left knee. Pt denies nausea.     1830 Dose of IV Dilaudid administered.     1840 Pt reports 7/10 tolerable pain.     1855 Pt resting quietly with her eyes closed.    1910 When roused pt reports 3/10 tolerable pain.    1925 No changes.     1940 Pt remains calm but reports her pain as a 7/10. Pt borderline with maintaining RA oxygen saturation of at least 90%. Pt encouraged to DB&C. Pt contacting her friend via text.    1950 Report to discharge ALTHEA Jameson.

## 2020-07-02 NOTE — ANESTHESIA POSTPROCEDURE EVALUATION
Patient: Sweta Gill    Procedure Summary     Date:  07/01/20 Room / Location:   OR 02 / SURGERY [facility]    Anesthesia Start:  1609 Anesthesia Stop:  1736    Procedure:  ORIF, FRACTURE, TIBIAL PLATEAU (Left Leg Lower) Diagnosis:  (left tibial plateau fracture )    Surgeon:  Pipo Vaca M.D. Responsible Provider:  Cristhian Bunch M.D.    Anesthesia Type:  general, peripheral nerve block ASA Status:  2          Final Anesthesia Type: general, peripheral nerve block  Last vitals  BP   Blood Pressure: 137/88, NIBP: 143/86    Temp   36.1 °C (97 °F)    Pulse   Pulse: 82   Resp   12    SpO2   100 %      Anesthesia Post Evaluation    Patient location during evaluation: PACU  Patient participation: complete - patient participated  Level of consciousness: awake and alert  Pain score: 5    Airway patency: patent  Anesthetic complications: no  Cardiovascular status: adequate and hemodynamically stable  Respiratory status: acceptable  Hydration status: acceptable    PONV: none           Nurse Pain Score: 8 (NPRS)

## 2020-07-02 NOTE — ANESTHESIA TIME REPORT
Anesthesia Start and Stop Event Times     Date Time Event    7/1/2020 1545 Ready for Procedure     1609 Anesthesia Start     1736 Anesthesia Stop        Responsible Staff  07/01/20    Name Role Begin End    Cristhian Bunch M.D. Anesth 1609 1736        Preop Diagnosis (Free Text):  Pre-op Diagnosis     left tibial plateau fracture         Preop Diagnosis (Codes):    Post op Diagnosis  Tibial plateau fracture, left      Premium Reason  A. 3PM - 7AM    Comments:

## 2021-03-02 ENCOUNTER — OFFICE VISIT (OUTPATIENT)
Dept: URGENT CARE | Facility: PHYSICIAN GROUP | Age: 31
End: 2021-03-02
Payer: COMMERCIAL

## 2021-03-02 VITALS
BODY MASS INDEX: 41.95 KG/M2 | SYSTOLIC BLOOD PRESSURE: 124 MMHG | DIASTOLIC BLOOD PRESSURE: 84 MMHG | HEART RATE: 76 BPM | OXYGEN SATURATION: 98 % | HEIGHT: 65 IN | TEMPERATURE: 97.5 F | RESPIRATION RATE: 16 BRPM | WEIGHT: 251.8 LBS

## 2021-03-02 DIAGNOSIS — J02.9 SORE THROAT: ICD-10-CM

## 2021-03-02 LAB
INT CON NEG: NORMAL
INT CON POS: NORMAL
S PYO AG THROAT QL: NEGATIVE

## 2021-03-02 PROCEDURE — 99214 OFFICE O/P EST MOD 30 MIN: CPT | Mod: 25 | Performed by: PHYSICIAN ASSISTANT

## 2021-03-02 PROCEDURE — 87880 STREP A ASSAY W/OPTIC: CPT | Performed by: PHYSICIAN ASSISTANT

## 2021-03-02 RX ORDER — DEXAMETHASONE SODIUM PHOSPHATE 10 MG/ML
10 INJECTION INTRAMUSCULAR; INTRAVENOUS ONCE
Status: COMPLETED | OUTPATIENT
Start: 2021-03-02 | End: 2021-03-02

## 2021-03-02 RX ADMIN — DEXAMETHASONE SODIUM PHOSPHATE 10 MG: 10 INJECTION INTRAMUSCULAR; INTRAVENOUS at 09:55

## 2021-03-02 ASSESSMENT — ENCOUNTER SYMPTOMS
WHEEZING: 0
SORE THROAT: 1
SPUTUM PRODUCTION: 0
CHILLS: 0
EYE DISCHARGE: 0
SHORTNESS OF BREATH: 0
PALPITATIONS: 0
EYE PAIN: 0
EYE REDNESS: 0
FEVER: 0
STRIDOR: 0
COUGH: 0
HEADACHES: 0
HEMOPTYSIS: 0

## 2021-03-02 NOTE — PROGRESS NOTES
Subjective:   Sweta Gill is a 30 y.o. female who presents for Sore Throat (painful swallowing, swelling, x2 days )      Pharyngitis   This is a new problem. The current episode started yesterday. The problem has been unchanged. There has been no fever. The pain is moderate. Associated symptoms include congestion and ear pain. Pertinent negatives include no coughing, ear discharge, headaches, shortness of breath or stridor. She has tried nothing for the symptoms.       Review of Systems   Constitutional: Positive for malaise/fatigue. Negative for chills and fever.   HENT: Positive for congestion, ear pain and sore throat. Negative for ear discharge.    Eyes: Negative for pain, discharge and redness.   Respiratory: Negative for cough, hemoptysis, sputum production, shortness of breath, wheezing and stridor.    Cardiovascular: Negative for chest pain and palpitations.   Skin: Negative for itching and rash.   Neurological: Negative for headaches.   All other systems reviewed and are negative.      Medications:    • dexamethasone    Allergies: Patient has no known allergies.    Problem List: Sweta Gill has Obesity (BMI 30-39.9) on their problem list.    Surgical History:  Past Surgical History:   Procedure Laterality Date   • TIBIA ORIF Left 7/1/2020    Procedure: ORIF, FRACTURE, TIBIAL PLATEAU;  Surgeon: Pipo Vaca M.D.;  Location: SURGERY [facility];  Service: Orthopedics       Past Social Hx: Sweta Gill  reports that she has been smoking cigarettes. She has been smoking about 0.50 packs per day. She has never used smokeless tobacco. She reports current alcohol use. She reports that she does not use drugs.     Past Family Hx:  Sweta Gill family history is not on file.     Problem list, medications, and allergies reviewed by myself today in Epic.     Objective:     Blood Pressure 124/84 (BP Location: Right arm, Patient Position: Sitting, BP Cuff Size: Adult)    "Pulse 76   Temperature 36.4 °C (97.5 °F) (Temporal)   Respiration 16   Height 1.651 m (5' 5\")   Weight 114 kg (251 lb 12.8 oz)   Oxygen Saturation 98%   Body Mass Index 41.90 kg/m²     Physical Exam  Vitals reviewed.   Constitutional:       General: She is not in acute distress.     Appearance: She is well-developed. She is not ill-appearing or toxic-appearing.      Interventions: She is not intubated.  HENT:      Head: Normocephalic and atraumatic.      Right Ear: Hearing, tympanic membrane, ear canal and external ear normal.      Left Ear: Hearing, tympanic membrane, ear canal and external ear normal.      Nose: Nose normal.      Mouth/Throat:      Pharynx: Uvula midline. Oropharyngeal exudate and posterior oropharyngeal erythema present.   Eyes:      General: Lids are normal.      Conjunctiva/sclera: Conjunctivae normal.   Cardiovascular:      Rate and Rhythm: Regular rhythm.      Heart sounds: Normal heart sounds, S1 normal and S2 normal. No murmur. No friction rub. No gallop.    Pulmonary:      Effort: Pulmonary effort is normal. No tachypnea, bradypnea, accessory muscle usage or respiratory distress. She is not intubated.      Breath sounds: Normal breath sounds. No stridor. No decreased breath sounds, wheezing, rhonchi or rales.   Chest:      Chest wall: No tenderness.   Musculoskeletal:         General: Normal range of motion.      Cervical back: Full passive range of motion without pain, normal range of motion and neck supple.   Skin:     General: Skin is warm and dry.   Neurological:      Mental Status: She is alert and oriented to person, place, and time.   Psychiatric:         Speech: Speech normal.         Behavior: Behavior normal.         Thought Content: Thought content normal.         Judgment: Judgment normal.     Rapid Strep: NEG      Assessment/Plan:     Medical Decision Making/Comments     Pt is a 30 yr old female who presents for evaluation of a sore throat.  Pt states having symptoms for " 2 days with other URI symptoms.  Pt denies red flag symptoms of stiff neck or unable to handle oral secretions. Pt is up to date with vaccinations.  Vital signs normal.  Pt appears well and non-toxic.  Exam shows erythema of the posterior pharynx with clear airway.  Pt has soft and supple neck with full ROM.  There is no tender cervical lymphadenopathy, muffled voice, trismus, posterior oral pharyngeal swelling or uvula deviation. Based from history, symptoms, and exam epiglottitis, retropharyngeal abscess, peritonsillar abscess, diphtheria are unlikely.  With a rapid strep test negative and a Centor criteria of less than four this likely represents a viral etiology.      Diagnosis differential includes but not limited to: bacterial pharyngitis, viral pharyngitis, stomatitis, candida albicans.          Diagnosis and associated orders     1. Sore throat  POCT Rapid Strep A    dexamethasone (DECADRON) injection (check route below) 10 mg              Differential diagnosis, natural history, supportive care, and indications for immediate follow-up discussed.    Advised the patient to follow-up with the primary care physician for recheck, reevaluation, and consideration of further management.    Please note that this dictation was created using voice recognition software. I have made a reasonable attempt to correct obvious errors, but I expect that there are errors of grammar and possibly content that I did not discover before finalizing the note.

## 2021-12-25 ENCOUNTER — OFFICE VISIT (OUTPATIENT)
Dept: URGENT CARE | Facility: PHYSICIAN GROUP | Age: 31
End: 2021-12-25
Payer: COMMERCIAL

## 2021-12-25 ENCOUNTER — HOSPITAL ENCOUNTER (OUTPATIENT)
Facility: MEDICAL CENTER | Age: 31
End: 2021-12-25
Attending: PHYSICIAN ASSISTANT
Payer: COMMERCIAL

## 2021-12-25 VITALS
SYSTOLIC BLOOD PRESSURE: 124 MMHG | HEART RATE: 86 BPM | RESPIRATION RATE: 16 BRPM | DIASTOLIC BLOOD PRESSURE: 86 MMHG | WEIGHT: 245 LBS | OXYGEN SATURATION: 97 % | TEMPERATURE: 97.3 F | BODY MASS INDEX: 40.82 KG/M2 | HEIGHT: 65 IN

## 2021-12-25 DIAGNOSIS — S39.012A STRAIN OF LUMBAR REGION, INITIAL ENCOUNTER: ICD-10-CM

## 2021-12-25 DIAGNOSIS — R11.0 NAUSEA: ICD-10-CM

## 2021-12-25 LAB
APPEARANCE UR: NORMAL
BILIRUB UR STRIP-MCNC: NEGATIVE MG/DL
COLOR UR AUTO: YELLOW
GLUCOSE UR STRIP.AUTO-MCNC: NEGATIVE MG/DL
KETONES UR STRIP.AUTO-MCNC: NORMAL MG/DL
LEUKOCYTE ESTERASE UR QL STRIP.AUTO: NORMAL
NITRITE UR QL STRIP.AUTO: NEGATIVE
PH UR STRIP.AUTO: 5.5 [PH] (ref 5–8)
PROT UR QL STRIP: NEGATIVE MG/DL
RBC UR QL AUTO: NORMAL
SP GR UR STRIP.AUTO: 1.02
UROBILINOGEN UR STRIP-MCNC: 0.2 MG/DL

## 2021-12-25 PROCEDURE — 99214 OFFICE O/P EST MOD 30 MIN: CPT | Performed by: PHYSICIAN ASSISTANT

## 2021-12-25 PROCEDURE — 81002 URINALYSIS NONAUTO W/O SCOPE: CPT | Performed by: PHYSICIAN ASSISTANT

## 2021-12-25 PROCEDURE — 87086 URINE CULTURE/COLONY COUNT: CPT

## 2021-12-25 RX ORDER — KETOROLAC TROMETHAMINE 30 MG/ML
30 INJECTION, SOLUTION INTRAMUSCULAR; INTRAVENOUS ONCE
Status: COMPLETED | OUTPATIENT
Start: 2021-12-25 | End: 2021-12-25

## 2021-12-25 RX ORDER — ONDANSETRON 4 MG/1
4 TABLET, ORALLY DISINTEGRATING ORAL EVERY 6 HOURS PRN
Qty: 15 TABLET | Refills: 0 | Status: SHIPPED | OUTPATIENT
Start: 2021-12-25 | End: 2022-09-08

## 2021-12-25 RX ORDER — NAPROXEN 500 MG/1
500 TABLET ORAL 2 TIMES DAILY WITH MEALS
Qty: 30 TABLET | Refills: 0 | Status: SHIPPED | OUTPATIENT
Start: 2021-12-25 | End: 2022-09-08

## 2021-12-25 RX ORDER — CYCLOBENZAPRINE HCL 5 MG
5-10 TABLET ORAL
Qty: 15 TABLET | Refills: 0 | Status: SHIPPED | OUTPATIENT
Start: 2021-12-25 | End: 2022-09-08

## 2021-12-25 RX ADMIN — KETOROLAC TROMETHAMINE 30 MG: 30 INJECTION, SOLUTION INTRAMUSCULAR; INTRAVENOUS at 14:31

## 2021-12-25 ASSESSMENT — ENCOUNTER SYMPTOMS
CONSTIPATION: 0
EYE PAIN: 0
CHILLS: 0
DIARRHEA: 0
MYALGIAS: 0
COUGH: 0
ABDOMINAL PAIN: 0
SHORTNESS OF BREATH: 0
VOMITING: 0
NAUSEA: 0
FEVER: 0
SORE THROAT: 0
BACK PAIN: 1
HEADACHES: 0

## 2021-12-25 NOTE — PROGRESS NOTES
"Subjective:   Sweta Gill is a 31 y.o. female who presents for Back Pain (started last night with middle back pain, and getting worse to make pt nausea, and unable to eat )      Is a pleasant 31-year-old female who presents complaining of right-sided upper lumbar/lower thoracic back pain that is been a problem for several weeks.  Typically she has some pain in with some gentle stretching and anti-inflammatory that seems to resolve.  Yesterday the pain seemed to be more severe, so much so it caused some nausea and decreased appetite.  She is not had any ramo emesis.  She denies any dysuria, frequency or urgency.  She does have a very active job and notes that the pain seems to be triggered by her job.  She denies any numbness or tingling or weakness of the legs, no radiation of pain, no bladder or bowel incontinence, no saddle anesthesia  reported      Review of Systems   Constitutional: Negative for chills and fever.   HENT: Negative for congestion, ear pain and sore throat.    Eyes: Negative for pain.   Respiratory: Negative for cough and shortness of breath.    Cardiovascular: Negative for chest pain.   Gastrointestinal: Negative for abdominal pain, constipation, diarrhea, nausea and vomiting.   Genitourinary: Negative for dysuria.   Musculoskeletal: Positive for back pain. Negative for myalgias.   Skin: Negative for rash.   Neurological: Negative for headaches.       Medications, Allergies, and current problem list reviewed today in Epic.     Objective:     /86 (BP Location: Left arm, Patient Position: Sitting, BP Cuff Size: Large adult)   Pulse 86   Temp 36.3 °C (97.3 °F) (Temporal)   Resp 16   Ht 1.651 m (5' 5\")   Wt 111 kg (245 lb)   SpO2 97%     Physical Exam  Vitals reviewed.   Constitutional:       Appearance: Normal appearance.   HENT:      Head: Normocephalic and atraumatic.      Right Ear: External ear normal.      Left Ear: External ear normal.      Nose: Nose normal.      " Mouth/Throat:      Mouth: Mucous membranes are moist.   Eyes:      Conjunctiva/sclera: Conjunctivae normal.   Cardiovascular:      Rate and Rhythm: Normal rate.   Pulmonary:      Effort: Pulmonary effort is normal.   Musculoskeletal:      Comments: TTP right-sided high lumbar/lower thoracic paraspinal region.  No true CVA tenderness.  Appreciated muscle spasm.  No obvious midline, step-off, crepitus or deformity.  Pain with rotation of the spine, flexion extension intact.  2+ symmetrical patellar reflexes bilaterally.  Ambulatory with steady station and gait.  5-5 strength of bilateral lower extremities   Skin:     General: Skin is warm and dry.      Capillary Refill: Capillary refill takes less than 2 seconds.   Neurological:      Mental Status: She is alert and oriented to person, place, and time.         Assessment/Plan:     Diagnosis and associated orders:     1. Strain of lumbar region, initial encounter  POCT Urinalysis    naproxen (NAPROSYN) 500 MG Tab    URINE CULTURE(NEW)    ondansetron (ZOFRAN ODT) 4 MG TABLET DISPERSIBLE    ketorolac (TORADOL) injection 30 mg    cyclobenzaprine (FLEXERIL) 5 mg tablet   2. Nausea  POCT Urinalysis    naproxen (NAPROSYN) 500 MG Tab    URINE CULTURE(NEW)    ondansetron (ZOFRAN ODT) 4 MG TABLET DISPERSIBLE    ketorolac (TORADOL) injection 30 mg    cyclobenzaprine (FLEXERIL) 5 mg tablet      Comments/MDM:     • Urinalysis somewhat inconclusive with moderate blood and trace leuks however her pain pattern, physical exam, and the chronicity of symptoms lead me to believe this is most likely musculoskeletal strain.  Recommended dose of Toradol in clinic and then I sent Flexeril and Zofran for her to start today as well as naproxen for her to start tomorrow.  Continue hydration.  If symptoms are worsening especially if any neurologic deficits present she should go to the ER immediately.  Low suspicion for cord compression syndrome at this time.  We will send out urine culture to  elucidate any possible bacterial cause as due diligence although again I doubt this is necessary.         Differential diagnosis, natural history, supportive care, and indications for immediate follow-up discussed.    Advised the patient to follow-up with the primary care physician for recheck, reevaluation, and consideration of further management.    Please note that this dictation was created using voice recognition software. I have made a reasonable attempt to correct obvious errors, but I expect that there are errors of grammar and possibly content that I did not discover before finalizing the note.    This note was electronically signed by Gonzalo Leigh PA-C

## 2021-12-26 DIAGNOSIS — S39.012A STRAIN OF LUMBAR REGION, INITIAL ENCOUNTER: ICD-10-CM

## 2021-12-26 DIAGNOSIS — R11.0 NAUSEA: ICD-10-CM

## 2021-12-28 LAB
BACTERIA UR CULT: NORMAL
SIGNIFICANT IND 70042: NORMAL
SITE SITE: NORMAL
SOURCE SOURCE: NORMAL

## 2022-09-08 ENCOUNTER — OCCUPATIONAL MEDICINE (OUTPATIENT)
Dept: URGENT CARE | Facility: PHYSICIAN GROUP | Age: 32
End: 2022-09-08
Payer: COMMERCIAL

## 2022-09-08 ENCOUNTER — HOSPITAL ENCOUNTER (OUTPATIENT)
Dept: RADIOLOGY | Facility: MEDICAL CENTER | Age: 32
End: 2022-09-08
Attending: FAMILY MEDICINE
Payer: COMMERCIAL

## 2022-09-08 VITALS
WEIGHT: 234 LBS | TEMPERATURE: 98.2 F | OXYGEN SATURATION: 97 % | BODY MASS INDEX: 38.99 KG/M2 | HEIGHT: 65 IN | SYSTOLIC BLOOD PRESSURE: 128 MMHG | HEART RATE: 60 BPM | DIASTOLIC BLOOD PRESSURE: 88 MMHG

## 2022-09-08 DIAGNOSIS — S89.92XA INJURY OF LEFT KNEE, INITIAL ENCOUNTER: ICD-10-CM

## 2022-09-08 PROCEDURE — 73564 X-RAY EXAM KNEE 4 OR MORE: CPT | Mod: LT

## 2022-09-08 PROCEDURE — 99213 OFFICE O/P EST LOW 20 MIN: CPT | Performed by: FAMILY MEDICINE

## 2022-09-08 ASSESSMENT — ENCOUNTER SYMPTOMS: FEVER: 0

## 2022-09-08 NOTE — PROGRESS NOTES
"Subjective:     Sweta Gill is a 31 y.o. female who presents for Knee Injury (About 2 weeks ago missed 2 steps when going down , Very painful)    HPI  Pt presents for evaluation of an acute problem  DOI: 8/22/22   VIDHYA:  About 2 weeks ago, patient injured her knee while climbing down ladder.  Missed the last 2 steps and landed on left knee   Pain is anterior knee just inferior to knee cap   Pain is better at rest   Pain is worse when fully bending the knee or when doing any squatting or kneeling   No significant swelling, bruising, erythema  No numbness or tingling  Able to ambulate with minimal pain    Review of Systems   Constitutional:  Negative for fever.   Skin:  Negative for rash.     PMH: Past medical history reviewed in Epic  MEDS: Medications were reviewed in Epic  ALLERGIES: Allergies were reviewed in Epic     Objective:   /88 (BP Location: Left arm, Patient Position: Sitting, BP Cuff Size: Adult)   Pulse 60   Temp 36.8 °C (98.2 °F) (Temporal)   Ht 1.651 m (5' 5\")   Wt 106 kg (234 lb)   SpO2 97%   BMI 38.94 kg/m²     Physical Exam  Constitutional:       General: She is not in acute distress.     Appearance: She is well-developed. She is not diaphoretic.   Pulmonary:      Effort: Pulmonary effort is normal.   Neurological:      Mental Status: She is alert.   Left knee  Appearance - No bruising, erythema, or deformity appreciated  Palpation - +Mild tenderness along the quad tendon, patellar tendon, and pes anserine  ROM - FROM extension, flexion to 90  Strength - 5/5 throughout  Neuro - Sensation equal and intact bilaterally  Special testing - No laxity or pain with varus/valgus stress, neg anterior drawer, neg posterior drawer, neg Lachman's, neg Chriss's, neg patellar apprehension test    Assessment/Plan:   Assessment    1. Injury of left knee, initial encounter  - DX-KNEE COMPLETE 4+ LEFT; Future    Patient with a left knee injury.  Suspect this is mostly quad strain.  Based on exam " and history, suspect she may have also had a patellar subluxation.  X-ray does not show any acute fracture or dislocation.  Treat with heat, NSAIDs, home exercise program, and activity modification.  D 39 and C4 given.  Follow-up in 1 week.

## 2022-09-08 NOTE — LETTER
"EMPLOYEE’S CLAIM FOR COMPENSATION/ REPORT OF INITIAL TREATMENT  FORM C-4    EMPLOYEE’S CLAIM - PROVIDE ALL INFORMATION REQUESTED   First Name  Sweta Last Name  Bridget Birthdate                    1990                Sex  female Claim Number (Insurer’s Use Only)   Home Address  [address] Age  31 y.o. Height  1.651 m (5' 5\") Weight  106 kg (234 lb) [facility]     [facility] Zip  [address] Telephone  [phone] (home) [phone] (work)   Mailing Address  [address] [facility] [facility] Zip  [address] Primary Language Spoken  English    Insurer   Third-Party   Clementina   Employee's Occupation (Job Title) When Injury or Occupational Disease Occurred      Employer's Name/Company Name  [employer]  Telephone      Office Mail Address (Number and Street)  [address] [facility]  Zip  [address]    Date of Injury  8/22/2022               Hours Injury  1:30 PM Date Employer Notified  8/22/2022 Last Day of Work after Injury     or Occupational Disease  9/8/2022 Supervisor to Whom Injury     Reported  Traci Whyte   Address or Location of Accident (if applicable)  Work [1]   What were you doing at the time of accident? (if applicable)  pulling from top shelf off ladder    How did this injury or occupational disease occur? (Be specific an answer in detail. Use additional sheet if necessary)  While stepping off a 4 step ladder.  Missed the last two steps, came fully down off of ladder onto floor.  Knee locked up full weight came down on knee.   If you believe that you have an occupational disease, when did you first have knowledge of the disability and it relationship to your employment?  N/A Witnesses to the Accident  N/A      Nature of Injury or Occupational Disease  Workers' Compensation  Part(s) of Body Injured or Affected  Knee (L), N/A, N/A    I certify that the above " is true and correct to the best of my knowledge and that I have provided this information in order to obtain the benefits of [place] of [place]).  I hereby authorize any physician, chiropractor, surgeon, practitioner, or other person, any hospital, including [facility] or [facility], any medical service organization, any insurance company, or other institution or organization to release to each other, any medical or other information, including benefits paid or payable, pertinent to this injury or disease, except information relative to diagnosis, treatment and/or counseling for AIDS, psychological conditions, alcohol or controlled substances, for which I must give specific authorization.  A Photostat of this authorization shall be as valid as the original.     Date   Place Employee’s Original or  *Electronic Signature   THIS REPORT MUST BE COMPLETED AND MAILED WITHIN 3 WORKING DAYS OF TREATMENT   Place  [facility] [place] [facility]  Name of Facility  [place]   Date  9/8/2022 Diagnosis and Description of Injury or Occupational Disease  (S89.92XA) Injury of left knee, initial encounter Is there evidence the injured employee was under the influence of alcohol and/or another controlled substance at the time of accident?  ? No ? Yes (if yes, please explain)   Hour  3:05 PM   The encounter diagnosis was Injury of left knee, initial encounter. No   Treatment  NSAIDs, heat, activity modification, home exercises  Have you advised the patient to remain off work five days or     more?    X-Ray Findings  Negative   ? Yes Indicate dates:   From   To      From information given by the employee, together with medical evidence, can        you directly connect this injury or occupational disease as job incurred?  Yes ? No If no, is the injured employee capable of:  ? full duty  No ? modified duty  Yes   Is additional  "medical care by a physician indicated?  Yes If Modified Duty, Specify any Limitations / Restrictions  No squatting, kneeling.  Limit time on feet to 6 hours per day    Do you know of any previous injury or disease contributing to this condition or occupational disease?  ? Yes ? No (Explain if yes)                          No  Comments:Hx of fracture in this knee, unlikely related to current injury/pain at all   Date  9/8/2022 Print Health Care Provider's   Nyla Clement M.D. I certify the employer’s copy of  this form was mailed on:   Address  [address] Insurer’s Use Only     [facility] [place]  [address]    Provider’s Tax ID Number  [id number] Telephone  Dept: [phone]             Health Care Provider’s Original or Electronic Signature  e-NYLA Fountain M.D., MD      * Complete and attach Release of Information (Form C-4A) when injured employee signs C-4 Form electronically  ORIGINAL - TREATING HEALTHCARE PROVIDER PAGE 2 - INSURER/TPA PAGE 3 - EMPLOYER PAGE 4 - EMPLOYEE             Form C-4 (rev.08/21)           BRIEF DESCRIPTION OF RIGHTS AND BENEFITS  (Pursuant to [place]RS 616C.050)    Notice of Injury or Occupational Disease (Incident Report Form C-1): If an injury or occupational disease (OD) arises out of and in the course of employment, you must provide written notice to your employer as soon as practicable, but no later than 7 days after the accident or OD. Your employer shall maintain a sufficient supply of the required forms.    Claim for Compensation (Form C-4): If medical treatment is sought, the form C-4 is available at the place of initial treatment. A completed \"Claim for Compensation\" (Form C-4) must be filed within 90 days after an accident or OD. The treating physician or chiropractor must, within 3 working days after treatment, complete and mail to the employer, the employer's insurer and third-party , the Claim for Compensation.    Medical " Treatment: If you require medical treatment for your on-the-job injury or OD, you may be required to select a physician or chiropractor from a list provided by your workers’ compensation insurer, if it has contracted with an Organization for Managed Care (MCO) or Preferred Provider Organization (PPO) or providers of health care. If your employer has not entered into a contract with an MCO or PPO, you may select a physician or chiropractor from the Panel of Physicians and Chiropractors. Any medical costs related to your industrial injury or OD will be paid by your insurer.    Temporary Total Disability (TTD): If your doctor has certified that you are unable to work for a period of at least 5 consecutive days, or 5 cumulative days in a 20-day period, or places restrictions on you that your employer does not accommodate, you may be entitled to TTD compensation.    Temporary Partial Disability (TPD): If the wage you receive upon reemployment is less than the compensation for TTD to which you are entitled, the insurer may be required to pay you TPD compensation to make up the difference. TPD can only be paid for a maximum of 24 months.    Permanent Partial Disability (PPD): When your medical condition is stable and there is an indication of a PPD as a result of your injury or OD, within 30 days, your insurer must arrange for an evaluation by a rating physician or chiropractor to determine the degree of your PPD. The amount of your PPD award depends on the date of injury, the results of the PPD evaluation, your age and wage.    Permanent Total Disability (PTD): If you are medically certified by a treating physician or chiropractor as permanently and totally disabled and have been granted a PTD status by your insurer, you are entitled to receive monthly benefits not to exceed 66 2/3% of your average monthly wage. The amount of your PTD payments is subject to reduction if you previously received a lump-sum PPD  award.    Vocational Rehabilitation Services: You may be eligible for vocational rehabilitation services if you are unable to return to the job due to a permanent physical impairment or permanent restrictions as a result of your injury or occupational disease.    Transportation and Per Asmita Reimbursement: You may be eligible for travel expenses and per asmita associated with medical treatment.    Reopening: You may be able to reopen your claim if your condition worsens after claim closure.     Appeal Process: If you disagree with a written determination issued by the insurer or the insurer does not respond to your request, you may appeal to the Department of Administration, , by following the instructions contained in your determination letter. You must appeal the determination within 70 days from the date of the determination letter at [address], [address], [place], [place] [address], or [address], [place], [place] [address]. If you disagree with the  decision, you may appeal to the Department of Administration, . You must file your appeal within 30 days from the date of the  decision letter at [address], [address], [place], [place] [address], or [address] [facility], [place], [place] [address]. If you disagree with a decision of an , you may file a petition for judicial review with the District Court. You must do so within 30 days of the Appeal Officer’s decision. You may be represented by an  at your own expense or you may contact the [facility] for possible representation.    [place]  for Injured Workers (NAIW): If you disagree with a  decision, you may request that NAIW represent you without charge at an  Hearing. For information regarding denial of benefits, you may contact the [facility] at: [address], [address], [place] [address], [phone]  [phone], or [address], [address], [place] [address], [phone]    To File a Complaint with the Division: If you wish to file a complaint with the  of the Division of Industrial Relations (DIR),  please contact the Workers’ Compensation Section, [address], [address], [place], [place] [address], telephone [phone], or [address], [address], [place], [place] [address], telephone [phone].    For assistance with Workers’ Compensation Issues: You may contact the [facility] Office for Consumer Health Assistance, [address], [address], [place] [address], Toll Free 1-395.981.2459, Web site: http://Replaced by [facility].[place].gov/Programs/RUMA E-mail: ruma@[facility].nv.[facility]              __________________________________________________________________                                    _________________            Employee Name / Signature                                                                                                                            Date                                                                                                                                                                                                                              D-2 (rev. 10/20)

## 2022-09-08 NOTE — LETTER
[facility] [address] [place] [address]  Phone:  [phone] - Fax:  [phone]   [facility] Progress Report and Disability Certification  Date of Service: 9/8/2022   No Show:  No  Date / Time of Next Visit: 9/15/2022   Claim Information   Patient Name: Sweta Gill  Claim Number:     Employer: [employer]  Date of Injury: 8/22/2022     Insurer / TPA: Clementina  ID / SSN:     Occupation:   Diagnosis: The encounter diagnosis was Injury of left knee, initial encounter.    Medical Information   Related to Industrial Injury? Yes    Subjective Complaints:  Pt presents for evaluation of an acute problem  DOI: 8/22/22   VIDHYA:  About 2 weeks ago, patient injured her knee while climbing down ladder.  Missed the last 2 steps and landed on left knee   Pain is anterior knee just inferior to knee cap   Pain is better at rest   Pain is worse when fully bending the knee or when doing any squatting or kneeling   No significant swelling, bruising, erythema  No numbness or tingling  Able to ambulate with minimal pain   Objective Findings: Left knee  Appearance - No bruising, erythema, or deformity appreciated  Palpation - +Mild tenderness along the quad tendon, patellar tendon, and pes anserine  ROM - FROM extension, flexion to 90  Strength - 5/5 throughout  Neuro - Sensation equal and intact bilaterally  Special testing - No laxity or pain with varus/valgus stress, neg anterior drawer, neg posterior drawer, neg Lachman's, neg Chriss's, neg patellar apprehension test   Pre-Existing Condition(s):     Assessment:   Initial Visit    Status: Additional Care Required  Permanent Disability:No    Plan:      Diagnostics: X-ray    Comments:       Disability Information   Status: Released to Restricted Duty    From:  9/8/2022  Through: 9/15/2022 Restrictions are: Temporary   Physical Restrictions   Sitting:    Standing:  < or = to 6 hrs/day  Stoopin hrs/day Bending:      Squattin hrs/day Walking:  < or = to 6 hrs/day Climbing:    Pushing:      Pulling:    Other:    Reaching Above Shoulder (L):   Reaching Above Shoulder (R):       Reaching Below Shoulder (L):    Reaching Below Shoulder (R):      Not to exceed Weight Limits   Carrying(hrs):   Weight Limit(lb):   Comments:40 lbs Lifting(hrs):   Weight  Limit(lb):   Comments:40 lbs   Comments:      Repetitive Actions   Hands: i.e. Fine Manipulations from Grasping:     Feet: i.e. Operating Foot Controls:     Driving / Operate Machinery:     Health Care Provider’s Original or Electronic Signature  Neri Clement M.D. Health Care Provider’s Original or Electronic Signature    Goran Le MD         Clinic Name / Location: [address] [address] Clinic Phone Number: Dept: [phone]   Appointment Time: 2:20 Pm Visit Start Time: 3:05 PM   Check-In Time:  2:16 Pm Visit Discharge Time:  4:30 PM   Original-Treating Physician or Chiropractor    Page 2-Insurer/TPA    Page 3-Employer    Page 4-Employee

## 2022-09-15 ENCOUNTER — OCCUPATIONAL MEDICINE (OUTPATIENT)
Dept: URGENT CARE | Facility: PHYSICIAN GROUP | Age: 32
End: 2022-09-15
Payer: COMMERCIAL

## 2022-09-15 VITALS
DIASTOLIC BLOOD PRESSURE: 68 MMHG | WEIGHT: 234 LBS | RESPIRATION RATE: 16 BRPM | OXYGEN SATURATION: 97 % | TEMPERATURE: 97.5 F | HEIGHT: 65 IN | HEART RATE: 81 BPM | SYSTOLIC BLOOD PRESSURE: 118 MMHG | BODY MASS INDEX: 38.99 KG/M2

## 2022-09-15 DIAGNOSIS — S89.92XD INJURY OF LEFT KNEE, SUBSEQUENT ENCOUNTER: ICD-10-CM

## 2022-09-15 PROCEDURE — 99213 OFFICE O/P EST LOW 20 MIN: CPT | Performed by: NURSE PRACTITIONER

## 2022-09-15 ASSESSMENT — ENCOUNTER SYMPTOMS
BRUISES/BLEEDS EASILY: 0
TINGLING: 0
SENSORY CHANGE: 0
FALLS: 0
CHILLS: 0
MYALGIAS: 1
WEAKNESS: 0

## 2022-09-15 NOTE — LETTER
"   [facility] [address] [place] [address]  Phone:  [phone] - Fax:  [phone]   Occ[facility]ational [facility] Network Progress Report and Disability Certification  Date of Service: 9/15/2022   No Show:  No  Date / Time of Next Visit: 9/22/2022   Claim Information   Patient Name: Sweta Gill  Claim Number:     Employer: [employer]  Date of Injury: 8/22/2022     Insurer / TPA: Clementina  ID / SSN:     Occupation:   Diagnosis: The encounter diagnosis was Injury of left knee, subsequent encounter.    Medical Information   Related to Industrial Injury? Yes    Subjective Complaints:  DOI: 8/22/22, initial visit:  VIDHYA:  About 2 weeks ago, patient injured her knee while climbing down ladder.  Missed the last 2 steps and landed on left knee   Pain is anterior knee just inferior to knee cap   Pain is better at rest   Pain is worse when fully bending the knee or when doing any squatting or kneeling   No significant swelling, bruising, erythema  No numbness or tingling  Able to ambulate with minimal pain    Today, 9/15/, 2nd encounter. States 20% improvement form last visit. States light duty at this time. Taking ibuprofen as needed, no ice application or knee brace used. Denies numbness/tingling in lower extremity. States feels \"weakness\" in knee joint with certain movements. States performing exercises as instructed by last urgent care provider once daily only.    Objective Findings: A/O x 3. Skin p/w/d, skin sensation intact. Full range of motion of left knee joint. No erythema, bruising, swelling, deformity or laxity at knee joint. Mild tenderness on palpation of inferior and superior aspects of left knee joint. No antalgic gait.    Pre-Existing Condition(s):     Assessment:   Condition Improved    Status: Additional Care Required  Permanent Disability:     Plan:      Diagnostics:      Comments:       Disability Information   Status: Released to " Restricted Duty    From:  9/15/2022  Through: 2022 Restrictions are: Temporary   Physical Restrictions   Sitting:    Standing:  < or = to 6 hrs/day Stoopin hrs/day Bending:      Squattin hrs/day Walking:  < or = to 6 hrs/day Climbin hrs/day Pushing:      Pulling:    Other:    Reaching Above Shoulder (L):   Reaching Above Shoulder (R):       Reaching Below Shoulder (L):    Reaching Below Shoulder (R):      Not to exceed Weight Limits   Carrying(hrs):   Weight Limit(lb):   Lifting(hrs):   Weight  Limit(lb):     Comments: -No changes in work restrictions at this time  -No lift/carry > 40 pounds  -May continue with Ibuprofen as needed for pain  -May use warm compress as needed for any joint stiffness without swelling to prevent joint stiffness  -Recheck next week for possible trial return to full duty without restrictions    Repetitive Actions   Hands: i.e. Fine Manipulations from Grasping:     Feet: i.e. Operating Foot Controls:     Driving / Operate Machinery:     Health Care Provider’s Original or Electronic Signature  SEAN HurdRStephanyN. Health Care Provider’s Original or Electronic Signature    Goran Le MD         Clinic Name / Location: [address] [address] Clinic Phone Number: Dept: [phone]   Appointment Time: 9:00 Am Visit Start Time: 8:59 AM   Check-In Time:  8:57 Am Visit Discharge Time:  10:03 AM   Original-Treating Physician or Chiropractor    Page 2-Insurer/TPA    Page 3-Employer    Page 4-Employee

## 2022-09-15 NOTE — PROGRESS NOTES
"Subjective     Sweta Amarilis Gill is a 31 y.o. female who presents with Follow-Up (WC FV #2/L knee injury, pt states it feels about the same but less pain. )      DOI: 8/22/22, initial visit:  VIDHYA:  About 2 weeks ago, patient injured her knee while climbing down ladder.  Missed the last 2 steps and landed on left knee   Pain is anterior knee just inferior to knee cap   Pain is better at rest   Pain is worse when fully bending the knee or when doing any squatting or kneeling   No significant swelling, bruising, erythema  No numbness or tingling  Able to ambulate with minimal pain    Today, 9/15/, 2nd encounter. States 20% improvement form last visit. States light duty at this time. Taking ibuprofen as needed, no ice application or knee brace used. Denies numbness/tingling in lower extremity. States feels \"weakness\" in knee joint with certain movements. States performing exercises as instructed by last urgent care provider once daily only.      HPI  PMH: No pertinent past medical history to this problem  MEDS: Medications were reviewed in Epic  ALLERGIES: Allergies were reviewed in Epic  FH: No pertinent family history to this problem       Review of Systems   Constitutional:  Negative for chills and malaise/fatigue.   Musculoskeletal:  Positive for joint pain and myalgias. Negative for falls.   Skin:  Negative for itching and rash.   Neurological:  Negative for tingling, sensory change and weakness.   Endo/Heme/Allergies:  Does not bruise/bleed easily.   All other systems reviewed and are negative.           Objective     /68 (BP Location: Left arm, Patient Position: Sitting, BP Cuff Size: Adult)   Pulse 81   Temp 36.4 °C (97.5 °F) (Temporal)   Resp 16   Ht 1.651 m (5' 5\")   Wt 106 kg (234 lb)   SpO2 97%   BMI 38.94 kg/m²      Physical Exam  Vitals reviewed.   Constitutional:       General: She is awake. She is not in acute distress.     Appearance: Normal appearance. She is well-developed. She is not " ill-appearing, toxic-appearing or diaphoretic.   HENT:      Head: Normocephalic.   Cardiovascular:      Rate and Rhythm: Normal rate.   Pulmonary:      Effort: Pulmonary effort is normal.   Musculoskeletal:         General: Normal range of motion.      Left knee: Tenderness present over the patellar tendon.        Legs:    Skin:     General: Skin is warm and dry.      Findings: No erythema or rash.   Neurological:      Mental Status: She is alert and oriented to person, place, and time.   Psychiatric:         Behavior: Behavior is cooperative.       A/O x 3. Skin p/w/d, skin sensation intact. Full range of motion of left knee joint. No erythema, bruising, swelling, deformity or laxity at knee joint. Mild tenderness on palpation of inferior and superior aspects of left knee joint. No antalgic gait.                    Assessment & Plan        1. Injury of left knee, subsequent encounter     -No changes in work restrictions at this time   -No lift/carry > 40 pounds   -May continue with Ibuprofen as needed for pain   -May use warm compress as needed for any joint stiffness without swelling to prevent joint stiffness   -Recheck next week for possible trial return to full duty without restrictions

## 2022-09-22 ENCOUNTER — OCCUPATIONAL MEDICINE (OUTPATIENT)
Dept: URGENT CARE | Facility: PHYSICIAN GROUP | Age: 32
End: 2022-09-22
Payer: COMMERCIAL

## 2022-09-22 VITALS
OXYGEN SATURATION: 97 % | DIASTOLIC BLOOD PRESSURE: 80 MMHG | RESPIRATION RATE: 16 BRPM | HEART RATE: 67 BPM | BODY MASS INDEX: 39.32 KG/M2 | WEIGHT: 236 LBS | TEMPERATURE: 97.4 F | HEIGHT: 65 IN | SYSTOLIC BLOOD PRESSURE: 124 MMHG

## 2022-09-22 DIAGNOSIS — S89.92XD INJURY OF LEFT KNEE, SUBSEQUENT ENCOUNTER: ICD-10-CM

## 2022-09-22 PROCEDURE — 99213 OFFICE O/P EST LOW 20 MIN: CPT | Performed by: FAMILY MEDICINE

## 2022-09-22 ASSESSMENT — ENCOUNTER SYMPTOMS: FEVER: 0

## 2022-09-22 NOTE — LETTER
[facility] [address] [place] [address]  Phone:  [phone] - Fax:  [phone]   Occ[facility]ational [facility] Network Progress Report and Disability Certification  Date of Service: 9/22/2022   No Show:  No  Date / Time of Next Visit: 9/28/2022   Claim Information   Patient Name: Sweta Gill  Claim Number:     Employer: [employer]  Date of Injury: 8/22/2022     Insurer / TPA: Clementina  ID / SSN:     Occupation:   Diagnosis: The encounter diagnosis was Injury of left knee, subsequent encounter.    Medical Information   Related to Industrial Injury? Yes    Subjective Complaints:  Pt presents for follow-up   DOI: 8/22/22   VIDHYA:  About 2 weeks ago, patient injured her knee while climbing down ladder.  Missed the last 2 steps and landed on left knee   Pain is improving quite a bit   Has no pain at times   Feels stable   No recent falls or injuries since last visit   No problems with light duty  No new complaints today    Objective Findings: Left knee  Appearance - No bruising, erythema, or deformity appreciated  Palpation - No tenderness to palpation along joint lines, patellar tendon, hamstring tendons, or quads.  No palpable effusion.  ROM - FROM without crepitus  Strength - 5/5 throughout  Neuro - Sensation equal and intact bilaterally  Special testing - No laxity or pain with varus/valgus stress, neg Chriss's, neg patellar apprehension test   Pre-Existing Condition(s):     Assessment:   Condition Improved    Status: Additional Care Required  Permanent Disability:No    Plan:      Diagnostics:      Comments:       Disability Information   Status: Released to Full Duty    From:  9/22/2022  Through: 9/28/2022 Restrictions are: Temporary   Physical Restrictions   Sitting:    Standing:    Stooping:    Bending:      Squatting:    Walking:    Climbing:    Pushing:      Pulling:    Other:    Reaching Above Shoulder (L):   Reaching Above Shoulder  (R):       Reaching Below Shoulder (L):    Reaching Below Shoulder (R):      Not to exceed Weight Limits   Carrying(hrs):   Weight Limit(lb):   Lifting(hrs):   Weight  Limit(lb):     Comments: Trial full duty x 1 week     Repetitive Actions   Hands: i.e. Fine Manipulations from Grasping:     Feet: i.e. Operating Foot Controls:     Driving / Operate Machinery:     Health Care Provider’s Original or Electronic Signature  Neri Clement M.D. Health Care Provider’s Original or Electronic Signature    Goran Le MD         Clinic Name / Location: [address] [address] Clinic Phone Number: Dept: [phone]   Appointment Time: 10:00 Am Visit Start Time: 9:48 AM   Check-In Time:  9:46 Am Visit Discharge Time:  10:17 AM   Original-Treating Physician or Chiropractor    Page 2-Insurer/TPA    Page 3-Employer    Page 4-Employee

## 2022-09-22 NOTE — PROGRESS NOTES
"Subjective:     Sweta Gill is a 31 y.o. female who presents for Follow-Up (WC FV /L knee injury, pt states it feels a lot better, slight discomfort when moving side to side)    HPI  Pt presents for follow-up   DOI: 8/22/22   VIDHYA:  About 2 weeks ago, patient injured her knee while climbing down ladder.  Missed the last 2 steps and landed on left knee   Pain is improving quite a bit   Has no pain at times   Feels stable   No recent falls or injuries since last visit   No problems with light duty  No new complaints today     Review of Systems   Constitutional:  Negative for fever.   Skin:  Negative for rash.     PMH: Past medical history reviewed in Epic  MEDS: Medications were reviewed in Epic  ALLERGIES: Allergies were reviewed in Epic     Objective:   /80 (BP Location: Left arm, Patient Position: Sitting, BP Cuff Size: Adult)   Pulse 67   Temp 36.3 °C (97.4 °F) (Temporal)   Resp 16   Ht 1.651 m (5' 5\")   Wt 107 kg (236 lb)   SpO2 97%   BMI 39.27 kg/m²     Physical Exam  Constitutional:       General: She is not in acute distress.     Appearance: She is well-developed. She is not diaphoretic.   Pulmonary:      Effort: Pulmonary effort is normal.   Neurological:      Mental Status: She is alert.   Left knee  Appearance - No bruising, erythema, or deformity appreciated  Palpation - No tenderness to palpation along joint lines, patellar tendon, hamstring tendons, or quads.  No palpable effusion.  ROM - FROM without crepitus  Strength - 5/5 throughout  Neuro - Sensation equal and intact bilaterally  Special testing - No laxity or pain with varus/valgus stress, neg Chriss's, neg patellar apprehension test    Assessment/Plan:   Assessment    1. Injury of left knee, subsequent encounter    Patient with left knee injury.  Making good improvements and feels she is ready to trial full duty.  Trial full duty for 1 week and follow-up for reevaluation.  Hopefully MMI at next visit.    "

## 2022-09-28 ENCOUNTER — OCCUPATIONAL MEDICINE (OUTPATIENT)
Dept: URGENT CARE | Facility: PHYSICIAN GROUP | Age: 32
End: 2022-09-28
Payer: COMMERCIAL

## 2022-09-28 VITALS
SYSTOLIC BLOOD PRESSURE: 122 MMHG | HEART RATE: 82 BPM | WEIGHT: 235 LBS | BODY MASS INDEX: 39.15 KG/M2 | OXYGEN SATURATION: 97 % | HEIGHT: 65 IN | DIASTOLIC BLOOD PRESSURE: 70 MMHG | RESPIRATION RATE: 16 BRPM | TEMPERATURE: 97 F

## 2022-09-28 DIAGNOSIS — S89.92XD INJURY OF LEFT KNEE, SUBSEQUENT ENCOUNTER: ICD-10-CM

## 2022-09-28 PROCEDURE — 99212 OFFICE O/P EST SF 10 MIN: CPT

## 2022-09-28 NOTE — LETTER
[facility] [address] [place] [address]  Phone:  [phone] - Fax:  [phone]   [facility] Progress Report and Disability Certification  Date of Service: 9/28/2022   No Show:  No  Date / Time of Next Visit:     Claim Information   Patient Name: Sweta Gill  Claim Number:     Employer: [employer]  Date of Injury: 8/22/2022     Insurer / TPA: [employer]  ID / SSN:     Occupation:   Diagnosis: The encounter diagnosis was Injury of left knee, subsequent encounter.    Medical Information   Related to Industrial Injury?      Subjective Complaints:  Patient presents today for her third WC FV. She reports her return to work without restrictions was successful. She states she has a little bit of pain as she is squatting but once she has completed the maneuver, the pain is completely gone. 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 Patient denies weakness, sensation of knee giving out, falls during the week. She denies use of knee brace.   Objective Findings: Left knee is free of tenderness to palpation. No deformity or dislocation. Strength is 5/5. Full and active range of motion of the left knee. Sensation is intact to light and sharp touch, symmetric to right extremity. Cap refill less than 2 seconds, 2+ DP pulse. Negative anterior drawer, posterior drawer, Chriss's. Skin is warm, dry, intact. No edema, erythema, ecchymosis, fluctuance.    Pre-Existing Condition(s):     Assessment:   Condition Improved    Status: Discharged /  MMI  Permanent Disability:No    Plan:      Diagnostics:      Comments:       Disability Information   Status: Released to Full Duty    From:  9/28/2022  Through:   Restrictions are:     Physical Restrictions   Sitting:    Standing:    Stooping:    Bending:      Squatting:    Walking:    Climbing:    Pushing:      Pulling:    Other:    Reaching Above Shoulder (L):   Reaching Above  Shoulder (R):       Reaching Below Shoulder (L):    Reaching Below Shoulder (R):      Not to exceed Weight Limits   Carrying(hrs):   Weight Limit(lb):   Lifting(hrs):   Weight  Limit(lb):     Comments:      Repetitive Actions   Hands: i.e. Fine Manipulations from Grasping:     Feet: i.e. Operating Foot Controls:     Driving / Operate Machinery:     Health Care Provider’s Original or Electronic Signature  Shanel Parrish D.N.P. Health Care Provider’s Original or Electronic Signature    Goran Le MD         Clinic Name / Location: [address] [address] Clinic Phone Number: Dept: [phone]   Appointment Time: 8:00 Am Visit Start Time: 8:10 AM   Check-In Time:  8:06 Am Visit Discharge Time:  8:39 AM   Original-Treating Physician or Chiropractor    Page 2-Insurer/TPA    Page 3-Employer    Page 4-Employee

## 2022-09-28 NOTE — PROGRESS NOTES
"Subjective:     Sweta Gill is a 31 y.o. female who presents for Knee Injury ( FV (L) knee and states she feels better)      Patient presents today for her third WC FV. She reports her return to work without restrictions was successful. She states she has a little bit of pain as she is squatting but once she has completed the maneuver, the pain is completely gone. 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 Patient denies weakness, sensation of knee giving out, falls during the week. She denies use of knee brace.    PMH:   No pertinent past medical history to this problem  MEDS:  Medications were reviewed in EMR  ALLERGIES:  Allergies were reviewed in EMR  SOCHX:  Works as a   FH:   No pertinent family history to this problem       Objective:     /70 (BP Location: Left arm, Patient Position: Sitting, BP Cuff Size: Large adult)   Pulse 82   Temp 36.1 °C (97 °F) (Temporal)   Resp 16   Ht 1.651 m (5' 5\")   Wt 107 kg (235 lb)   SpO2 97%   BMI 39.11 kg/m²     Left knee is free of tenderness to palpation. No deformity or dislocation. Strength is 5/5. Full and active range of motion of the left knee. Sensation is intact to light and sharp touch, symmetric to right extremity. Cap refill less than 2 seconds, 2+ DP pulse. Negative anterior drawer, posterior drawer, Chriss's. Skin is warm, dry, intact. No edema, erythema, ecchymosis, fluctuance.     Assessment/Plan:       There are no diagnoses linked to this encounter.  Released to Full Duty FROM 9/28/2022    D 39 completed today     Differential diagnosis, natural history, supportive care, and indications for immediate follow-up discussed.    "

## 2024-11-20 ENCOUNTER — HOSPITAL ENCOUNTER (OUTPATIENT)
Facility: MEDICAL CENTER | Age: 34
End: 2024-11-20
Payer: COMMERCIAL

## 2024-11-20 ENCOUNTER — OFFICE VISIT (OUTPATIENT)
Dept: URGENT CARE | Facility: PHYSICIAN GROUP | Age: 34
End: 2024-11-20
Payer: COMMERCIAL

## 2024-11-20 VITALS
SYSTOLIC BLOOD PRESSURE: 124 MMHG | RESPIRATION RATE: 20 BRPM | HEIGHT: 65 IN | TEMPERATURE: 97.8 F | HEART RATE: 85 BPM | WEIGHT: 254.8 LBS | BODY MASS INDEX: 42.45 KG/M2 | DIASTOLIC BLOOD PRESSURE: 80 MMHG | OXYGEN SATURATION: 97 %

## 2024-11-20 DIAGNOSIS — J02.9 PHARYNGITIS, UNSPECIFIED ETIOLOGY: ICD-10-CM

## 2024-11-20 DIAGNOSIS — Z00.00 ROUTINE ADULT HEALTH MAINTENANCE: ICD-10-CM

## 2024-11-20 DIAGNOSIS — R53.83 FATIGUE, UNSPECIFIED TYPE: ICD-10-CM

## 2024-11-20 DIAGNOSIS — R59.0 LYMPHADENOPATHY OF RIGHT CERVICAL REGION: ICD-10-CM

## 2024-11-20 LAB
HETEROPH AB SER QL LA: NEGATIVE
POCT INT CON NEG: NEGATIVE
POCT INT CON POS: POSITIVE
S PYO DNA SPEC NAA+PROBE: NOT DETECTED

## 2024-11-20 PROCEDURE — 87070 CULTURE OTHR SPECIMN AEROBIC: CPT

## 2024-11-20 PROCEDURE — 3079F DIAST BP 80-89 MM HG: CPT | Performed by: REGISTERED NURSE

## 2024-11-20 PROCEDURE — 3074F SYST BP LT 130 MM HG: CPT | Performed by: REGISTERED NURSE

## 2024-11-20 PROCEDURE — 99213 OFFICE O/P EST LOW 20 MIN: CPT | Performed by: REGISTERED NURSE

## 2024-11-20 PROCEDURE — 87651 STREP A DNA AMP PROBE: CPT | Performed by: REGISTERED NURSE

## 2024-11-20 PROCEDURE — 86308 HETEROPHILE ANTIBODY SCREEN: CPT | Performed by: REGISTERED NURSE

## 2024-11-20 PROCEDURE — 87077 CULTURE AEROBIC IDENTIFY: CPT

## 2024-11-20 RX ORDER — DEXAMETHASONE SODIUM PHOSPHATE 10 MG/ML
10 INJECTION INTRAMUSCULAR; INTRAVENOUS ONCE
Status: DISCONTINUED | OUTPATIENT
Start: 2024-11-20 | End: 2024-11-20

## 2024-11-20 RX ORDER — DEXAMETHASONE SODIUM PHOSPHATE 10 MG/ML
10 INJECTION INTRAMUSCULAR; INTRAVENOUS ONCE
Status: COMPLETED | OUTPATIENT
Start: 2024-11-20 | End: 2024-11-20

## 2024-11-20 RX ADMIN — DEXAMETHASONE SODIUM PHOSPHATE 10 MG: 10 INJECTION INTRAMUSCULAR; INTRAVENOUS at 16:59

## 2024-11-20 ASSESSMENT — ENCOUNTER SYMPTOMS
CHILLS: 0
SHORTNESS OF BREATH: 0
COUGH: 0
PALPITATIONS: 0
TINGLING: 0
FEVER: 0
DIZZINESS: 0
HEADACHES: 0
WEAKNESS: 0
FATIGUE: 1
WHEEZING: 0
DOUBLE VISION: 0
NAUSEA: 0
BLURRED VISION: 0
DIARRHEA: 0
SORE THROAT: 1
VOMITING: 0
MYALGIAS: 0

## 2024-11-21 DIAGNOSIS — J02.9 PHARYNGITIS, UNSPECIFIED ETIOLOGY: ICD-10-CM

## 2024-11-21 NOTE — PROGRESS NOTES
"Subjective:   Sweta Gill is a 33 y.o. female who presents for Pharyngitis (Swollen on right side, hard to swallow, pt sts her throat only seems to hurt when its dry or when she talks a lot ) and Fatigue      Patient presents with complaint of sore throat since yesterday, bothering her swallowing and having discomfort when talking. Denies difficulty breathing.  She also endorses feeling fatigue and \"out-of-it\" for 1.5 weeks, but denies any fever, chills, myalgias, or any upper respiratory symptoms.  Has not done any viral testing. Taking tylenol with minor improvement.       Pharyngitis   Pertinent negatives include no congestion, coughing, diarrhea, ear pain, headaches, shortness of breath or vomiting.   Fatigue  Associated symptoms include fatigue and a sore throat. Pertinent negatives include no chest pain, chills, congestion, coughing, fever, headaches, myalgias, nausea, rash, vomiting or weakness.       Review of Systems   Constitutional:  Positive for fatigue and malaise/fatigue. Negative for chills and fever.   HENT:  Positive for sore throat. Negative for congestion and ear pain.    Eyes:  Negative for blurred vision and double vision.   Respiratory:  Negative for cough, shortness of breath and wheezing.    Cardiovascular:  Negative for chest pain and palpitations.   Gastrointestinal:  Negative for diarrhea, nausea and vomiting.   Genitourinary:  Negative for dysuria.   Musculoskeletal:  Negative for myalgias.   Skin:  Negative for rash.   Neurological:  Negative for dizziness, tingling, weakness and headaches.   All other systems reviewed and are negative.    Refer to HPI for additional details.    During this visit, appropriate PPE was worn, and hand hygiene was performed.    PMH:  has no past medical history on file.    MEDS: No current outpatient medications on file.    ALLERGIES:   Allergies   Allergen Reactions    Ibuprofen Hives     SURGHX:   Past Surgical History:   Procedure Laterality " "Date    ORIF, FRACTURE, TIBIA Left 7/1/2020    Procedure: ORIF, FRACTURE, TIBIAL PLATEAU;  Surgeon: iPpo Vaca M.D.;  Location: SURGERY [facility];  Service: Orthopedics     SOCHX:  reports that she has quit smoking. Her smoking use included cigarettes. She has never used smokeless tobacco. She reports current alcohol use of about 8.4 oz of alcohol per week. She reports that she does not use drugs.    FH: Per HPI as applicable/pertinent.    Medications, Allergies, and current problem list reviewed today in Epic.     Objective:     /80   Pulse 85   Temp 36.6 °C (97.8 °F) (Temporal)   Resp 20   Ht 1.651 m (5' 5\")   Wt 116 kg (254 lb 12.8 oz)   SpO2 97%     Physical Exam  Constitutional:       Appearance: She is ill-appearing.   HENT:      Head: Normocephalic.      Right Ear: Tympanic membrane, ear canal and external ear normal.      Left Ear: Tympanic membrane, ear canal and external ear normal.      Nose: Nose normal. No congestion or rhinorrhea.      Mouth/Throat:      Mouth: Mucous membranes are moist. No injury or oral lesions.      Tongue: Tongue does not deviate from midline.      Palate: No lesions.      Pharynx: Uvula midline. Pharyngeal swelling, oropharyngeal exudate and posterior oropharyngeal erythema present. No uvula swelling or postnasal drip.      Tonsils: Tonsillar exudate present. No tonsillar abscesses. 2+ on the right. 2+ on the left.        Comments: Erythema without injection throughout pharynx.  2+ bilateral tonsils, exudates visible on right tonsil.  Uvula midline, no signs of tonsillar abscess.  Airway patent.  Eyes:      General:         Right eye: No discharge.         Left eye: No discharge.      Extraocular Movements: Extraocular movements intact.      Conjunctiva/sclera: Conjunctivae normal.      Pupils: Pupils are equal, round, and reactive to light.   Neck:      Comments: Right cervical lymphadenopathy.  Cardiovascular:      Rate and Rhythm: Normal rate and " regular rhythm.      Pulses: Normal pulses.      Heart sounds: Normal heart sounds. No murmur heard.  Pulmonary:      Effort: Pulmonary effort is normal. No respiratory distress.      Breath sounds: Normal breath sounds. No wheezing or rhonchi.   Abdominal:      General: Abdomen is flat.      Palpations: Abdomen is soft. There is no hepatomegaly, splenomegaly or mass.   Musculoskeletal:         General: No signs of injury. Normal range of motion.      Cervical back: Normal range of motion. No rigidity.   Lymphadenopathy:      Cervical: Cervical adenopathy present.   Skin:     General: Skin is warm and dry.   Neurological:      General: No focal deficit present.      Mental Status: She is alert and oriented to person, place, and time.      Motor: No weakness.         Lab Results/POC Test Results   Results for orders placed or performed in visit on 11/20/24   POCT CEPHEID GROUP A STREP - PCR    Collection Time: 11/20/24  4:58 PM   Result Value Ref Range    POC Group A Strep, PCR Not Detected Not Detected, Invalid   POCT Mononucleosis (mono)    Collection Time: 11/20/24  4:58 PM   Result Value Ref Range    Heterophile Screen Negative Negative, Invalid    Internal Control Positive Positive     Internal Control Negative Negative             Assessment/Plan:     Diagnosis and associated orders:     1. Fatigue, unspecified type  - POCT Mononucleosis (mono)    2. Pharyngitis, unspecified etiology  - POCT CEPHEID GROUP A STREP - PCR  - POCT Mononucleosis (mono)  - dexamethasone (Decadron) injection (check route below) 10 mg  - CULTURE THROAT; Future    3. Routine adult health maintenance  - Referral to establish with PCP    4. Lymphadenopathy of right cervical region  - POCT Mononucleosis (mono)     Comments/MDM:     Decadron 10 mg p.o. administered in clinic for symptomatic relief.  POC mononucleosis and strep tests resulted as negative for both.  Throat swab sent for culture.  Discussed with patient to anticipate MyChart  message or phone call from this provider in the next 2 to 3 days regarding the results of the culture and any changes with the treatment and management that is needed.  Patient is in agreements with this plan of care.  Discussed home care & management, including but not limited to plenty of rest and fluid intake, Tylenol and ibuprofen as needed for symptom management, infection precautions to prevent spread, and OTC pharmacological management of symptoms  RTC in 3 to 5 days if symptoms worsen.  Red flag symptoms warranting emergency medical attention reviewed with patient, including but not limited to difficulty breathing or swallowing, increased swelling in the tonsils or throat, chest pain, shortness of breath, fever greater than 103 Fahrenheit, severe abdominal pain, and bloody vomit or diarrhea.           Differential diagnosis, natural history, supportive care, and indications for immediate follow-up discussed.    Advised the patient to follow-up with the primary care physician for recheck, reevaluation, and consideration of further management.    Instructed patient to seek emergency medical attention via calling EMS or going to the Emergency Room for red flag symptoms, including but not limited to: chest pain, palpitations, fever greater than 103F, shortness of breath, wheezing, new or worsened numbness/tingling, focal or unilateral weakness, and bloody vomit/stool.     Please note that this dictation was created using voice recognition software. I have made a reasonable attempt to correct obvious errors, but I expect that there are errors of grammar and possibly content that I did not discover before finalizing the note.    This note was electronically signed by SARAH Kay.    Patient was initially seen by SHELTON Kay.  I independently evaluated and assessed the patient.  We collaborated on medical decision making and treatment plan.  I agree with assessment and plan.

## 2024-11-22 DIAGNOSIS — J02.0 STREP PHARYNGITIS: ICD-10-CM

## 2024-11-22 RX ORDER — AMOXICILLIN 500 MG/1
500 CAPSULE ORAL 2 TIMES DAILY
Qty: 14 CAPSULE | Refills: 0 | Status: SHIPPED | OUTPATIENT
Start: 2024-11-22 | End: 2024-11-29

## 2024-11-23 LAB
BACTERIA SPEC RESP CULT: ABNORMAL
BACTERIA SPEC RESP CULT: ABNORMAL
SIGNIFICANT IND 70042: ABNORMAL
SITE SITE: ABNORMAL
SOURCE SOURCE: ABNORMAL

## (undated) DEVICE — CANISTER SUCTION RIGID RED 1500CC (40EA/CA)

## (undated) DEVICE — STAPLER SKIN DISP - (6/BX 10BX/CA) VISISTAT

## (undated) DEVICE — SUTURE 2-0 VICRYL PLUS CT-1 36 (36PK/BX)"

## (undated) DEVICE — SENSOR SPO2 NEO LNCS ADHESIVE (20/BX) SEE USER NOTES

## (undated) DEVICE — SUTURE 2-0 VICRYL PLUS CT-1 - 8 X 18 INCH(12/BX)

## (undated) DEVICE — BAG SPONGE COUNT 10.25 X 32 - BLUE (250/CA)

## (undated) DEVICE — CHLORAPREP 26 ML APPLICATOR - ORANGE TINT(25/CA)

## (undated) DEVICE — DRAPE C-ARM LARGE 41IN X 74 IN - (10/BX 2BX/CA)

## (undated) DEVICE — SUTURE 4-0 MONOCRYL PLUS PS-1 - 27 INCH (36/BX)

## (undated) DEVICE — SUTURE 0 VICRYL PLUS CT-1 - 8 X 18 INCH (12/BX)

## (undated) DEVICE — HEAD HOLDER JUNIOR/ADULT

## (undated) DEVICE — ELECTRODE 850 FOAM ADHESIVE - HYDROGEL RADIOTRNSPRNT (50/PK)

## (undated) DEVICE — WATER IRRIGATION STERILE 1000ML (12EA/CA)

## (undated) DEVICE — SUCTION INSTRUMENT YANKAUER BULBOUS TIP W/O VENT (50EA/CA)

## (undated) DEVICE — LACTATED RINGERS INJ 1000 ML - (14EA/CA 60CA/PF)

## (undated) DEVICE — KIT ANESTHESIA W/CIRCUIT & 3/LT BAG W/FILTER (20EA/CA)

## (undated) DEVICE — HUMID-VENT HEAT AND MOISTURE EXCHANGE- (50/BX)

## (undated) DEVICE — GLOVE BIOGEL SZ 7.5 SURGICAL PF LTX - (50PR/BX 4BX/CA)

## (undated) DEVICE — SUTURE MONOCRYL CT-2 VIO - (36/BX)

## (undated) DEVICE — GLOVE BIOGEL INDICATOR SZ 8 SURGICAL PF LTX - (50/BX 4BX/CA)

## (undated) DEVICE — MASK ANESTHESIA ADULT  - (100/CA)

## (undated) DEVICE — NEPTUNE 4 PORT MANIFOLD - (20/PK)

## (undated) DEVICE — ELECTRODE DUAL RETURN W/ CORD - (50/PK)

## (undated) DEVICE — SODIUM CHL IRRIGATION 0.9% 1000ML (12EA/CA)

## (undated) DEVICE — PACK LOWER EXTREMITY - (2/CA)

## (undated) DEVICE — GOWN WARMING STANDARD FLEX - (30/CA)

## (undated) DEVICE — SUTURE GENERAL

## (undated) DEVICE — PROTECTOR ULNA NERVE - (36PR/CA)

## (undated) DEVICE — TUBE CONNECTING SUCTION - CLEAR PLASTIC STERILE 72 IN (50EA/CA)

## (undated) DEVICE — GLOVE, LITE (PAIR)